# Patient Record
Sex: FEMALE | Race: OTHER | URBAN - METROPOLITAN AREA
[De-identification: names, ages, dates, MRNs, and addresses within clinical notes are randomized per-mention and may not be internally consistent; named-entity substitution may affect disease eponyms.]

---

## 2018-01-28 ENCOUNTER — EMERGENCY (EMERGENCY)
Facility: HOSPITAL | Age: 63
LOS: 0 days | Discharge: HOME | End: 2018-01-28
Admitting: EMERGENCY MEDICINE

## 2018-01-28 DIAGNOSIS — R11.0 NAUSEA: ICD-10-CM

## 2018-01-28 DIAGNOSIS — R05 COUGH: ICD-10-CM

## 2018-01-28 DIAGNOSIS — I10 ESSENTIAL (PRIMARY) HYPERTENSION: ICD-10-CM

## 2018-01-28 DIAGNOSIS — Z91.018 ALLERGY TO OTHER FOODS: ICD-10-CM

## 2018-01-28 DIAGNOSIS — J45.901 UNSPECIFIED ASTHMA WITH (ACUTE) EXACERBATION: ICD-10-CM

## 2018-01-28 DIAGNOSIS — R19.7 DIARRHEA, UNSPECIFIED: ICD-10-CM

## 2018-03-18 ENCOUNTER — EMERGENCY (EMERGENCY)
Facility: HOSPITAL | Age: 63
LOS: 0 days | Discharge: HOME | End: 2018-03-18

## 2018-03-18 VITALS
TEMPERATURE: 98 F | OXYGEN SATURATION: 98 % | RESPIRATION RATE: 18 BRPM | HEART RATE: 76 BPM | DIASTOLIC BLOOD PRESSURE: 85 MMHG | SYSTOLIC BLOOD PRESSURE: 155 MMHG

## 2018-03-18 DIAGNOSIS — J45.909 UNSPECIFIED ASTHMA, UNCOMPLICATED: ICD-10-CM

## 2018-03-18 DIAGNOSIS — Z96.659 PRESENCE OF UNSPECIFIED ARTIFICIAL KNEE JOINT: ICD-10-CM
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
## 2018-03-18 DIAGNOSIS — Z79.899 OTHER LONG TERM (CURRENT) DRUG THERAPY: ICD-10-CM

## 2018-03-18 DIAGNOSIS — I10 ESSENTIAL (PRIMARY) HYPERTENSION: ICD-10-CM

## 2018-03-18 DIAGNOSIS — M25.562 PAIN IN LEFT KNEE: ICD-10-CM

## 2018-03-18 DIAGNOSIS — Z96.659 PRESENCE OF UNSPECIFIED ARTIFICIAL KNEE JOINT: Chronic | ICD-10-CM
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
## 2018-03-18 DIAGNOSIS — M25.572 PAIN IN LEFT ANKLE AND JOINTS OF LEFT FOOT: ICD-10-CM
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
## 2018-03-18 RX ORDER — IBUPROFEN 200 MG
600 TABLET ORAL ONCE
Qty: 0 | Refills: 0 | Status: COMPLETED | OUTPATIENT
Start: 2018-03-18 | End: 2018-03-18

## 2018-03-18 RX ORDER — ALBUTEROL 90 UG/1
0 AEROSOL, METERED ORAL
Qty: 0 | Refills: 0 | COMMUNITY

## 2018-03-18 RX ORDER — MONTELUKAST 4 MG/1
0 TABLET, CHEWABLE ORAL
Qty: 0 | Refills: 0 | COMMUNITY

## 2018-03-18 RX ADMIN — Medication 600 MILLIGRAM(S): at 10:27

## 2018-03-18 RX ADMIN — Medication 600 MILLIGRAM(S): at 11:12

## 2018-03-18 NOTE — ED PROVIDER NOTE - OBJECTIVE STATEMENT
61 yo female with h/o asthma and HTN presents to the ED s/p mechanical fall last night 1030 pm c/o left ankle and left knee pain. Patient tripped outside last night. No head trauma or LOC. No addition trauma. no blood thinners. Denies fever, chills, chest pain, sob, abdominal, N/V, headache, dizziness, LE weakness or paresthesias. Patient having difficulty ambulating since fall. 61 yo female with h/o asthma and HTN presents to the ED s/p mechanical fall last night 1030 pm c/o left ankle and left knee pain. Patient tripped outside last night. No head trauma or LOC. No addition trauma. no blood thinners. Denies fever, chills, chest pain, sob, abdominal, N/V, headache, dizziness, LE weakness or paresthesias. Patient having difficulty ambulating since fall. + h/o left knee replacement.

## 2018-03-18 NOTE — ED PROVIDER NOTE - PHYSICAL EXAMINATION
GENERAL:  well appearing, non-toxic patient in no acute distress  SKIN: skin warm, pink and dry. MMM. no swelling, erythema, abrasions, laceration to left LE.   PULM: CTAB. Normal respiratory effort. No respiratory distress. No wheezes, stridor, rales or rhonchi. No retractions  CV: RRR, no M/R/G.  MSK: + TTP to left lateral mallelous and anterior knee joint with decreased ROM due to pain. no metatarsal tenderness. unable to weight bear. No edema, erythema, cyanosis. Distal pulses intact.  NEURO: A+Ox3, no sensory/motor deficits

## 2018-03-18 NOTE — ED ADULT NURSE NOTE - CHIEF COMPLAINT QUOTE
Fall last night while walking and twisted left knee pain. patient complaint of throbbing, swelling and increased pain

## 2018-03-18 NOTE — ED PROVIDER NOTE - NS ED ROS FT
Constitutional: no fever, chills  Cardiovascular: no chest pain, no sob, no syncope , no palpitations, no peripheral edema  Respiratory: no cough, no shortness of breath, no h/o asthma or COPD.  Gastrointestinal: no nausea, vomiting  Musculoskeletal: + left knee and left ankle pain. no neck or back pain   Integumentary: no rash or skin changes. no edema  Neurological: no UE/LE weakness or paresthesias.

## 2021-06-16 PROBLEM — Z00.00 ENCOUNTER FOR PREVENTIVE HEALTH EXAMINATION: Status: ACTIVE | Noted: 2021-06-16

## 2021-12-13 ENCOUNTER — TRANSCRIPTION ENCOUNTER (OUTPATIENT)
Age: 66
End: 2021-12-13

## 2022-07-06 PROBLEM — I10 ESSENTIAL (PRIMARY) HYPERTENSION: Chronic | Status: ACTIVE | Noted: 2018-03-18

## 2022-07-06 PROBLEM — J45.909 UNSPECIFIED ASTHMA, UNCOMPLICATED: Chronic | Status: ACTIVE | Noted: 2018-03-18

## 2022-07-10 ENCOUNTER — NON-APPOINTMENT (OUTPATIENT)
Age: 67
End: 2022-07-10

## 2022-07-13 ENCOUNTER — APPOINTMENT (OUTPATIENT)
Dept: PULMONOLOGY | Facility: CLINIC | Age: 67
End: 2022-07-13

## 2022-07-25 ENCOUNTER — APPOINTMENT (OUTPATIENT)
Dept: PULMONOLOGY | Facility: CLINIC | Age: 67
End: 2022-07-25

## 2022-09-26 ENCOUNTER — APPOINTMENT (OUTPATIENT)
Dept: PULMONOLOGY | Facility: CLINIC | Age: 67
End: 2022-09-26

## 2023-02-28 ENCOUNTER — INPATIENT (INPATIENT)
Facility: HOSPITAL | Age: 68
LOS: 10 days | Discharge: HOME HEALTH | DRG: 286 | End: 2023-03-11
Attending: STUDENT IN AN ORGANIZED HEALTH CARE EDUCATION/TRAINING PROGRAM | Admitting: INTERNAL MEDICINE
Payer: OTHER GOVERNMENT

## 2023-02-28 VITALS
HEART RATE: 110 BPM | RESPIRATION RATE: 21 BRPM | DIASTOLIC BLOOD PRESSURE: 83 MMHG | HEIGHT: 68 IN | WEIGHT: 220.9 LBS | TEMPERATURE: 99 F | SYSTOLIC BLOOD PRESSURE: 141 MMHG

## 2023-02-28 DIAGNOSIS — J96.22 ACUTE AND CHRONIC RESPIRATORY FAILURE WITH HYPERCAPNIA: ICD-10-CM

## 2023-02-28 DIAGNOSIS — Z96.659 PRESENCE OF UNSPECIFIED ARTIFICIAL KNEE JOINT: Chronic | ICD-10-CM

## 2023-02-28 LAB
ALBUMIN SERPL ELPH-MCNC: 4 G/DL — SIGNIFICANT CHANGE UP (ref 3.5–5.2)
ALP SERPL-CCNC: 98 U/L — SIGNIFICANT CHANGE UP (ref 30–115)
ALT FLD-CCNC: 28 U/L — SIGNIFICANT CHANGE UP (ref 0–41)
ANION GAP SERPL CALC-SCNC: 7 MMOL/L — SIGNIFICANT CHANGE UP (ref 7–14)
AST SERPL-CCNC: 29 U/L — SIGNIFICANT CHANGE UP (ref 0–41)
BASE EXCESS BLDV CALC-SCNC: 4.1 MMOL/L — HIGH (ref -2–3)
BASOPHILS # BLD AUTO: 0.02 K/UL — SIGNIFICANT CHANGE UP (ref 0–0.2)
BASOPHILS NFR BLD AUTO: 0.4 % — SIGNIFICANT CHANGE UP (ref 0–1)
BILIRUB SERPL-MCNC: 0.3 MG/DL — SIGNIFICANT CHANGE UP (ref 0.2–1.2)
BUN SERPL-MCNC: 19 MG/DL — SIGNIFICANT CHANGE UP (ref 10–20)
CA-I SERPL-SCNC: 1.25 MMOL/L — SIGNIFICANT CHANGE UP (ref 1.15–1.33)
CALCIUM SERPL-MCNC: 8.8 MG/DL — SIGNIFICANT CHANGE UP (ref 8.4–10.5)
CHLORIDE SERPL-SCNC: 109 MMOL/L — SIGNIFICANT CHANGE UP (ref 98–110)
CO2 SERPL-SCNC: 27 MMOL/L — SIGNIFICANT CHANGE UP (ref 17–32)
CREAT SERPL-MCNC: 0.9 MG/DL — SIGNIFICANT CHANGE UP (ref 0.7–1.5)
EGFR: 71 ML/MIN/1.73M2 — SIGNIFICANT CHANGE UP
EOSINOPHIL # BLD AUTO: 0.07 K/UL — SIGNIFICANT CHANGE UP (ref 0–0.7)
EOSINOPHIL NFR BLD AUTO: 1.6 % — SIGNIFICANT CHANGE UP (ref 0–8)
FLUAV AG NPH QL: SIGNIFICANT CHANGE UP
FLUBV AG NPH QL: SIGNIFICANT CHANGE UP
GAS PNL BLDV: 139 MMOL/L — SIGNIFICANT CHANGE UP (ref 136–145)
GAS PNL BLDV: SIGNIFICANT CHANGE UP
GAS PNL BLDV: SIGNIFICANT CHANGE UP
GLUCOSE SERPL-MCNC: 96 MG/DL — SIGNIFICANT CHANGE UP (ref 70–99)
HCO3 BLDV-SCNC: 32 MMOL/L — HIGH (ref 22–29)
HCT VFR BLD CALC: 37 % — SIGNIFICANT CHANGE UP (ref 37–47)
HCT VFR BLDA CALC: 32 % — LOW (ref 39–51)
HGB BLD CALC-MCNC: 10.7 G/DL — LOW (ref 12.6–17.4)
HGB BLD-MCNC: 11.3 G/DL — LOW (ref 12–16)
IMM GRANULOCYTES NFR BLD AUTO: 0.2 % — SIGNIFICANT CHANGE UP (ref 0.1–0.3)
LACTATE BLDV-MCNC: 0.8 MMOL/L — SIGNIFICANT CHANGE UP (ref 0.5–2)
LYMPHOCYTES # BLD AUTO: 0.74 K/UL — LOW (ref 1.2–3.4)
LYMPHOCYTES # BLD AUTO: 16.4 % — LOW (ref 20.5–51.1)
MCHC RBC-ENTMCNC: 27.8 PG — SIGNIFICANT CHANGE UP (ref 27–31)
MCHC RBC-ENTMCNC: 30.5 G/DL — LOW (ref 32–37)
MCV RBC AUTO: 91.1 FL — SIGNIFICANT CHANGE UP (ref 81–99)
MONOCYTES # BLD AUTO: 0.29 K/UL — SIGNIFICANT CHANGE UP (ref 0.1–0.6)
MONOCYTES NFR BLD AUTO: 6.4 % — SIGNIFICANT CHANGE UP (ref 1.7–9.3)
NEUTROPHILS # BLD AUTO: 3.38 K/UL — SIGNIFICANT CHANGE UP (ref 1.4–6.5)
NEUTROPHILS NFR BLD AUTO: 75 % — SIGNIFICANT CHANGE UP (ref 42.2–75.2)
NRBC # BLD: 0 /100 WBCS — SIGNIFICANT CHANGE UP (ref 0–0)
PCO2 BLDV: 63 MMHG — HIGH (ref 39–42)
PH BLDV: 7.31 — LOW (ref 7.32–7.43)
PLATELET # BLD AUTO: 235 K/UL — SIGNIFICANT CHANGE UP (ref 130–400)
PO2 BLDV: 16 MMHG — SIGNIFICANT CHANGE UP
POTASSIUM BLDV-SCNC: 4.2 MMOL/L — SIGNIFICANT CHANGE UP (ref 3.5–5.1)
POTASSIUM SERPL-MCNC: 4.4 MMOL/L — SIGNIFICANT CHANGE UP (ref 3.5–5)
POTASSIUM SERPL-SCNC: 4.4 MMOL/L — SIGNIFICANT CHANGE UP (ref 3.5–5)
PROT SERPL-MCNC: 6.4 G/DL — SIGNIFICANT CHANGE UP (ref 6–8)
RBC # BLD: 4.06 M/UL — LOW (ref 4.2–5.4)
RBC # FLD: 13.5 % — SIGNIFICANT CHANGE UP (ref 11.5–14.5)
RSV RNA NPH QL NAA+NON-PROBE: SIGNIFICANT CHANGE UP
SAO2 % BLDV: 17 % — SIGNIFICANT CHANGE UP
SARS-COV-2 RNA SPEC QL NAA+PROBE: SIGNIFICANT CHANGE UP
SODIUM SERPL-SCNC: 143 MMOL/L — SIGNIFICANT CHANGE UP (ref 135–146)
TROPONIN T SERPL-MCNC: <0.01 NG/ML — SIGNIFICANT CHANGE UP
WBC # BLD: 4.51 K/UL — LOW (ref 4.8–10.8)
WBC # FLD AUTO: 4.51 K/UL — LOW (ref 4.8–10.8)

## 2023-02-28 PROCEDURE — 80048 BASIC METABOLIC PNL TOTAL CA: CPT

## 2023-02-28 PROCEDURE — 87081 CULTURE SCREEN ONLY: CPT

## 2023-02-28 PROCEDURE — 84145 PROCALCITONIN (PCT): CPT

## 2023-02-28 PROCEDURE — A9560: CPT

## 2023-02-28 PROCEDURE — C1769: CPT

## 2023-02-28 PROCEDURE — 83540 ASSAY OF IRON: CPT

## 2023-02-28 PROCEDURE — 83880 ASSAY OF NATRIURETIC PEPTIDE: CPT

## 2023-02-28 PROCEDURE — 87449 NOS EACH ORGANISM AG IA: CPT

## 2023-02-28 PROCEDURE — 82728 ASSAY OF FERRITIN: CPT

## 2023-02-28 PROCEDURE — 86334 IMMUNOFIX E-PHORESIS SERUM: CPT

## 2023-02-28 PROCEDURE — 94640 AIRWAY INHALATION TREATMENT: CPT

## 2023-02-28 PROCEDURE — 80053 COMPREHEN METABOLIC PANEL: CPT

## 2023-02-28 PROCEDURE — 85027 COMPLETE CBC AUTOMATED: CPT

## 2023-02-28 PROCEDURE — 83550 IRON BINDING TEST: CPT

## 2023-02-28 PROCEDURE — U0005: CPT

## 2023-02-28 PROCEDURE — C1894: CPT

## 2023-02-28 PROCEDURE — 93458 L HRT ARTERY/VENTRICLE ANGIO: CPT

## 2023-02-28 PROCEDURE — 78803 RP LOCLZJ TUM SPECT 1 AREA: CPT

## 2023-02-28 PROCEDURE — 85379 FIBRIN DEGRADATION QUANT: CPT

## 2023-02-28 PROCEDURE — 87040 BLOOD CULTURE FOR BACTERIA: CPT

## 2023-02-28 PROCEDURE — 0225U NFCT DS DNA&RNA 21 SARSCOV2: CPT

## 2023-02-28 PROCEDURE — 93306 TTE W/DOPPLER COMPLETE: CPT

## 2023-02-28 PROCEDURE — 84156 ASSAY OF PROTEIN URINE: CPT

## 2023-02-28 PROCEDURE — 87899 AGENT NOS ASSAY W/OPTIC: CPT

## 2023-02-28 PROCEDURE — 71045 X-RAY EXAM CHEST 1 VIEW: CPT

## 2023-02-28 PROCEDURE — C1887: CPT

## 2023-02-28 PROCEDURE — 36415 COLL VENOUS BLD VENIPUNCTURE: CPT

## 2023-02-28 PROCEDURE — 83735 ASSAY OF MAGNESIUM: CPT

## 2023-02-28 PROCEDURE — 99285 EMERGENCY DEPT VISIT HI MDM: CPT | Mod: GC

## 2023-02-28 PROCEDURE — 83605 ASSAY OF LACTIC ACID: CPT

## 2023-02-28 PROCEDURE — 81001 URINALYSIS AUTO W/SCOPE: CPT

## 2023-02-28 PROCEDURE — 93010 ELECTROCARDIOGRAM REPORT: CPT | Mod: 77

## 2023-02-28 PROCEDURE — 87086 URINE CULTURE/COLONY COUNT: CPT

## 2023-02-28 PROCEDURE — 71045 X-RAY EXAM CHEST 1 VIEW: CPT | Mod: 26

## 2023-02-28 PROCEDURE — U0003: CPT

## 2023-02-28 PROCEDURE — 83521 IG LIGHT CHAINS FREE EACH: CPT

## 2023-02-28 PROCEDURE — 86803 HEPATITIS C AB TEST: CPT

## 2023-02-28 PROCEDURE — 85025 COMPLETE CBC W/AUTO DIFF WBC: CPT

## 2023-02-28 PROCEDURE — 86335 IMMUNFIX E-PHORSIS/URINE/CSF: CPT

## 2023-02-28 RX ORDER — AZITHROMYCIN 500 MG/1
500 TABLET, FILM COATED ORAL ONCE
Refills: 0 | Status: COMPLETED | OUTPATIENT
Start: 2023-02-28 | End: 2023-02-28

## 2023-02-28 RX ORDER — MAGNESIUM SULFATE 500 MG/ML
2 VIAL (ML) INJECTION ONCE
Refills: 0 | Status: COMPLETED | OUTPATIENT
Start: 2023-02-28 | End: 2023-02-28

## 2023-02-28 RX ORDER — ONDANSETRON 8 MG/1
4 TABLET, FILM COATED ORAL ONCE
Refills: 0 | Status: COMPLETED | OUTPATIENT
Start: 2023-02-28 | End: 2023-02-28

## 2023-02-28 RX ORDER — ACETAMINOPHEN 500 MG
975 TABLET ORAL ONCE
Refills: 0 | Status: COMPLETED | OUTPATIENT
Start: 2023-02-28 | End: 2023-02-28

## 2023-02-28 RX ORDER — IPRATROPIUM/ALBUTEROL SULFATE 18-103MCG
3 AEROSOL WITH ADAPTER (GRAM) INHALATION
Refills: 0 | Status: COMPLETED | OUTPATIENT
Start: 2023-02-28 | End: 2023-02-28

## 2023-02-28 RX ORDER — CEFTRIAXONE 500 MG/1
1000 INJECTION, POWDER, FOR SOLUTION INTRAMUSCULAR; INTRAVENOUS ONCE
Refills: 0 | Status: COMPLETED | OUTPATIENT
Start: 2023-02-28 | End: 2023-02-28

## 2023-02-28 RX ADMIN — Medication 125 MILLIGRAM(S): at 18:08

## 2023-02-28 RX ADMIN — Medication 3 MILLILITER(S): at 18:17

## 2023-02-28 RX ADMIN — AZITHROMYCIN 255 MILLIGRAM(S): 500 TABLET, FILM COATED ORAL at 19:51

## 2023-02-28 RX ADMIN — Medication 150 GRAM(S): at 18:17

## 2023-02-28 RX ADMIN — Medication 975 MILLIGRAM(S): at 22:14

## 2023-02-28 RX ADMIN — Medication 3 MILLILITER(S): at 18:08

## 2023-02-28 RX ADMIN — CEFTRIAXONE 100 MILLIGRAM(S): 500 INJECTION, POWDER, FOR SOLUTION INTRAMUSCULAR; INTRAVENOUS at 18:00

## 2023-02-28 NOTE — ED PROVIDER NOTE - OBJECTIVE STATEMENT
67-year-old female past medical history of asthma with multiple hospitalizations last was over 5 years ago, no intubation presents for asthma exacerbation.  History goes back to 3 weeks ago where patient had a URI and had to use her Symbicort and rescue inhaler more often.  Patient use her Symbicort inhaler twice daily at baseline.  Over the last 3 days her shortness of breath acutely worsened where she is using her albuterol inhaler every 15 to 20 minutes.  Patient was picked up by EMS and given DuoNebs that made her feel better.  No nausea, vomiting, fever, chest pain, back pain, abdominal pain.  No sputum production.

## 2023-02-28 NOTE — ED ADULT TRIAGE NOTE - MODE OF ARRIVAL
EMS Ambulance negative normal/ROM intact/normal gait/strength 5/5 bilateral upper extremities/strength 5/5 bilateral lower extremities

## 2023-02-28 NOTE — ED PROVIDER NOTE - CLINICAL SUMMARY MEDICAL DECISION MAKING FREE TEXT BOX
Labs okay, chest x-ray with possible right middle lobe/right lower lobe infiltrate.  Patient improved with nebs and steroids, work of breathing is normal now, still with scattered wheezing.  Given antibiotics, fluids, will admit.

## 2023-02-28 NOTE — ED PROVIDER NOTE - PROGRESS NOTE DETAILS
TN - s/p 3duonebs and steroids; pt moving air w/ wheezes L>R; Xr noted, compared to previous, it is very similar but has increased infiltrated/fluffy. discussed admission w/ pt; she amenable.

## 2023-02-28 NOTE — ED PROVIDER NOTE - PHYSICAL EXAMINATION
CONSTITUTIONAL: nontoxic appearing, in no acute distress  HEAD:  normocephalic, atraumatic  EYES:  no conjunctival injection, no eye discharge, tracking well  ENT:  tympanic membranes intact bilaterally, moist mucous membranes, no oropharyngeal ulcerations or lesions, no tonsillar swelling or erythema, no tonsillar exudates  NECK:  supple, no masses, no tender anterior/posterior cervical lymphadenopathy  CV:  regular rate and rhythm, cap refill < 2 seconds  RESP:  poor airmovement  ABD:  soft, nontender, nondistended, no masses, no organomegaly  LYMPH:  no significant lymphadenopathy  MSK/NEURO:  normal movement, normal tone  SKIN:  warm, dry, no rash

## 2023-02-28 NOTE — ED PROVIDER NOTE - CARE PLAN
1 Principal Discharge DX:	Acute on chronic respiratory failure with hypercapnia  Secondary Diagnosis:	COPD exacerbation  Secondary Diagnosis:	Opacity of lung on imaging study

## 2023-02-28 NOTE — ED PROVIDER NOTE - CONSIDERATION OF ADMISSION OBSERVATION
pt still with SOB despite nebs, though she is improved; CXR with poss RML/RLL infiltrate Consideration of Admission/Observation

## 2023-02-28 NOTE — ED PROVIDER NOTE - ATTENDING CONTRIBUTION TO CARE
67-year-old woman, history of asthma with hospitalization in the past but no history of intubation presents for asthma exacerbation.  Patient reports URI symptoms over the past 3 weeks, has been using her Symbicort and rescue inhaler very frequently.  Over the last 3 days, shortness of breath is worsened, and there is been no improvement with her MDI.  She was so uncomfortable today that she called 911, she was given DuoNebs in the ambulance, but still tachypneic, tachycardic, with poor air movement on initial evaluation in the ED.  Speaking in short sentences, increased work of breathing.  Will give nebs, steroids, check labs and chest x-ray, reassess.

## 2023-03-01 LAB
ANION GAP SERPL CALC-SCNC: 9 MMOL/L — SIGNIFICANT CHANGE UP (ref 7–14)
BASOPHILS # BLD AUTO: 0 K/UL — SIGNIFICANT CHANGE UP (ref 0–0.2)
BASOPHILS NFR BLD AUTO: 0 % — SIGNIFICANT CHANGE UP (ref 0–1)
BUN SERPL-MCNC: 17 MG/DL — SIGNIFICANT CHANGE UP (ref 10–20)
CALCIUM SERPL-MCNC: 8.9 MG/DL — SIGNIFICANT CHANGE UP (ref 8.4–10.5)
CHLORIDE SERPL-SCNC: 106 MMOL/L — SIGNIFICANT CHANGE UP (ref 98–110)
CO2 SERPL-SCNC: 26 MMOL/L — SIGNIFICANT CHANGE UP (ref 17–32)
CREAT SERPL-MCNC: 0.8 MG/DL — SIGNIFICANT CHANGE UP (ref 0.7–1.5)
D DIMER BLD IA.RAPID-MCNC: 180 NG/ML DDU — SIGNIFICANT CHANGE UP
EGFR: 82 ML/MIN/1.73M2 — SIGNIFICANT CHANGE UP
EOSINOPHIL # BLD AUTO: 0 K/UL — SIGNIFICANT CHANGE UP (ref 0–0.7)
EOSINOPHIL NFR BLD AUTO: 0 % — SIGNIFICANT CHANGE UP (ref 0–8)
GLUCOSE SERPL-MCNC: 146 MG/DL — HIGH (ref 70–99)
HCT VFR BLD CALC: 35.5 % — LOW (ref 37–47)
HCV AB S/CO SERPL IA: 0.04 COI — SIGNIFICANT CHANGE UP
HCV AB SERPL-IMP: SIGNIFICANT CHANGE UP
HGB BLD-MCNC: 10.8 G/DL — LOW (ref 12–16)
IMM GRANULOCYTES NFR BLD AUTO: 0.4 % — HIGH (ref 0.1–0.3)
LYMPHOCYTES # BLD AUTO: 0.39 K/UL — LOW (ref 1.2–3.4)
LYMPHOCYTES # BLD AUTO: 7.5 % — LOW (ref 20.5–51.1)
MAGNESIUM SERPL-MCNC: 2.2 MG/DL — SIGNIFICANT CHANGE UP (ref 1.8–2.4)
MCHC RBC-ENTMCNC: 28 PG — SIGNIFICANT CHANGE UP (ref 27–31)
MCHC RBC-ENTMCNC: 30.4 G/DL — LOW (ref 32–37)
MCV RBC AUTO: 92 FL — SIGNIFICANT CHANGE UP (ref 81–99)
MONOCYTES # BLD AUTO: 0.15 K/UL — SIGNIFICANT CHANGE UP (ref 0.1–0.6)
MONOCYTES NFR BLD AUTO: 2.9 % — SIGNIFICANT CHANGE UP (ref 1.7–9.3)
NEUTROPHILS # BLD AUTO: 4.62 K/UL — SIGNIFICANT CHANGE UP (ref 1.4–6.5)
NEUTROPHILS NFR BLD AUTO: 89.2 % — HIGH (ref 42.2–75.2)
NRBC # BLD: 0 /100 WBCS — SIGNIFICANT CHANGE UP (ref 0–0)
PLATELET # BLD AUTO: 234 K/UL — SIGNIFICANT CHANGE UP (ref 130–400)
POTASSIUM SERPL-MCNC: 4.5 MMOL/L — SIGNIFICANT CHANGE UP (ref 3.5–5)
POTASSIUM SERPL-SCNC: 4.5 MMOL/L — SIGNIFICANT CHANGE UP (ref 3.5–5)
RBC # BLD: 3.86 M/UL — LOW (ref 4.2–5.4)
RBC # FLD: 13.5 % — SIGNIFICANT CHANGE UP (ref 11.5–14.5)
SODIUM SERPL-SCNC: 141 MMOL/L — SIGNIFICANT CHANGE UP (ref 135–146)
WBC # BLD: 5.18 K/UL — SIGNIFICANT CHANGE UP (ref 4.8–10.8)
WBC # FLD AUTO: 5.18 K/UL — SIGNIFICANT CHANGE UP (ref 4.8–10.8)

## 2023-03-01 PROCEDURE — 99223 1ST HOSP IP/OBS HIGH 75: CPT

## 2023-03-01 RX ORDER — FERROUS SULFATE 325(65) MG
325 TABLET ORAL DAILY
Refills: 0 | Status: DISCONTINUED | OUTPATIENT
Start: 2023-03-01 | End: 2023-03-11

## 2023-03-01 RX ORDER — FLUTICASONE PROPIONATE AND SALMETEROL 50; 250 UG/1; UG/1
1 POWDER ORAL; RESPIRATORY (INHALATION)
Qty: 0 | Refills: 0 | DISCHARGE

## 2023-03-01 RX ORDER — IBUPROFEN 200 MG
600 TABLET ORAL ONCE
Refills: 0 | Status: COMPLETED | OUTPATIENT
Start: 2023-03-01 | End: 2023-03-01

## 2023-03-01 RX ORDER — AZITHROMYCIN 500 MG/1
500 TABLET, FILM COATED ORAL DAILY
Refills: 0 | Status: DISCONTINUED | OUTPATIENT
Start: 2023-03-01 | End: 2023-03-03

## 2023-03-01 RX ORDER — CEFTRIAXONE 500 MG/1
1000 INJECTION, POWDER, FOR SOLUTION INTRAMUSCULAR; INTRAVENOUS EVERY 24 HOURS
Refills: 0 | Status: DISCONTINUED | OUTPATIENT
Start: 2023-03-01 | End: 2023-03-02

## 2023-03-01 RX ORDER — ALBUTEROL 90 UG/1
2 AEROSOL, METERED ORAL
Refills: 0 | Status: DISCONTINUED | OUTPATIENT
Start: 2023-03-01 | End: 2023-03-11

## 2023-03-01 RX ORDER — BUDESONIDE AND FORMOTEROL FUMARATE DIHYDRATE 160; 4.5 UG/1; UG/1
2 AEROSOL RESPIRATORY (INHALATION)
Refills: 0 | Status: DISCONTINUED | OUTPATIENT
Start: 2023-03-01 | End: 2023-03-11

## 2023-03-01 RX ORDER — INFLUENZA VIRUS VACCINE 15; 15; 15; 15 UG/.5ML; UG/.5ML; UG/.5ML; UG/.5ML
0.7 SUSPENSION INTRAMUSCULAR ONCE
Refills: 0 | Status: DISCONTINUED | OUTPATIENT
Start: 2023-03-01 | End: 2023-03-11

## 2023-03-01 RX ORDER — IPRATROPIUM/ALBUTEROL SULFATE 18-103MCG
3 AEROSOL WITH ADAPTER (GRAM) INHALATION EVERY 6 HOURS
Refills: 0 | Status: DISCONTINUED | OUTPATIENT
Start: 2023-03-01 | End: 2023-03-11

## 2023-03-01 RX ORDER — ALBUTEROL 90 UG/1
2 AEROSOL, METERED ORAL EVERY 6 HOURS
Refills: 0 | Status: DISCONTINUED | OUTPATIENT
Start: 2023-03-01 | End: 2023-03-01

## 2023-03-01 RX ORDER — ENOXAPARIN SODIUM 100 MG/ML
40 INJECTION SUBCUTANEOUS EVERY 24 HOURS
Refills: 0 | Status: DISCONTINUED | OUTPATIENT
Start: 2023-03-01 | End: 2023-03-06

## 2023-03-01 RX ORDER — MONTELUKAST 4 MG/1
10 TABLET, CHEWABLE ORAL DAILY
Refills: 0 | Status: DISCONTINUED | OUTPATIENT
Start: 2023-03-01 | End: 2023-03-11

## 2023-03-01 RX ADMIN — Medication 3 MILLILITER(S): at 13:51

## 2023-03-01 RX ADMIN — ENOXAPARIN SODIUM 40 MILLIGRAM(S): 100 INJECTION SUBCUTANEOUS at 05:05

## 2023-03-01 RX ADMIN — Medication 325 MILLIGRAM(S): at 12:19

## 2023-03-01 RX ADMIN — AZITHROMYCIN 500 MILLIGRAM(S): 500 TABLET, FILM COATED ORAL at 12:18

## 2023-03-01 RX ADMIN — Medication 600 MILLIGRAM(S): at 20:54

## 2023-03-01 RX ADMIN — Medication 40 MILLIGRAM(S): at 05:06

## 2023-03-01 RX ADMIN — MONTELUKAST 10 MILLIGRAM(S): 4 TABLET, CHEWABLE ORAL at 12:19

## 2023-03-01 RX ADMIN — BUDESONIDE AND FORMOTEROL FUMARATE DIHYDRATE 2 PUFF(S): 160; 4.5 AEROSOL RESPIRATORY (INHALATION) at 08:45

## 2023-03-01 RX ADMIN — Medication 3 MILLILITER(S): at 20:53

## 2023-03-01 RX ADMIN — BUDESONIDE AND FORMOTEROL FUMARATE DIHYDRATE 2 PUFF(S): 160; 4.5 AEROSOL RESPIRATORY (INHALATION) at 20:00

## 2023-03-01 RX ADMIN — Medication 600 MILLIGRAM(S): at 20:24

## 2023-03-01 RX ADMIN — CEFTRIAXONE 100 MILLIGRAM(S): 500 INJECTION, POWDER, FOR SOLUTION INTRAMUSCULAR; INTRAVENOUS at 17:31

## 2023-03-01 NOTE — H&P ADULT - ASSESSMENT
67-year-old female past medical history of asthma with multiple hospitalizations without intubation, last was 15 years ago, presents with SOB. History goes back to 3 weeks ago where patient had SOB with cough and had to use her Symbicort and albuterol rescue inhaler more often. Patient use her Symbicort inhaler twice daily at baseline. Over the last 3 days her shortness of breath acutely worsened where she is using her albuterol inhaler every 15 to 20 minutes with no relief. Her oxygen saturation is 95% at baseline, but it decreased to 92%. Patient was picked up by EMS and given DuoNebs that made her feel better.  No nausea, vomiting, fever, chest pain, back pain, abdominal pain. No sputum production.    In ED, treated with azithromycin, ceftriaxone, duoneb, solumedrol 125 mg, zofran.  Admitted for asthma exacerbation and possible pneumonia    #Asthma exacerbation, possible pneumonia  - CXR showing RLL opacity   67-year-old female past medical history of asthma with multiple hospitalizations without intubation, last was 15 years ago, iron deficiency anemia presents with SOB. History goes back to 3 weeks ago where patient had SOB with cough and had to use her Symbicort and albuterol rescue inhaler more often. Patient use her Symbicort inhaler twice daily at baseline. Over the last 3 days her shortness of breath acutely worsened where she is using her albuterol inhaler every 15 to 20 minutes with no relief. Her oxygen saturation is 95% at baseline, but it decreased to 92%. Patient was picked up by EMS and given DuoNebs that made her feel better.  No nausea, vomiting, fever, chest pain, back pain, abdominal pain. No sputum production.    In ED, treated with azithromycin, ceftriaxone, duoneb, solumedrol 125 mg, zofran.  Admitted for asthma exacerbation and possible pneumonia    #SOB likely due to Asthma exacerbation, possible pneumonia  - CXR showing RLL opacity, f/u official read  - VBG pCO2 elevated  - no wheezing on my exam  - Albuterol, symbicort, duonebs  - prednisone 40 mg 5 days  - Ceftriaxone 1 g daily  - BIPAP at night for CO2 retention    #Iron deficiency anemia  - ferrous sulfate daily    #Diet- regular  #DVT prophylaxis- lovenox 40 mg daily  Pending- wean off oxygen, resolution of chest tightness and SOB 67-year-old female past medical history of asthma with multiple hospitalizations without intubation, last was 15 years ago, iron deficiency anemia presents with SOB. History goes back to 3 weeks ago where patient had SOB with cough and had to use her Symbicort and albuterol rescue inhaler more often. Patient use her Symbicort inhaler twice daily at baseline. Over the last 3 days her shortness of breath acutely worsened where she is using her albuterol inhaler every 15 to 20 minutes with no relief. Her oxygen saturation is 95% at baseline, but it decreased to 92%. Patient was picked up by EMS and given DuoNebs that made her feel better.  No nausea, vomiting, fever, chest pain, back pain, abdominal pain. No sputum production.    In ED, treated with azithromycin, ceftriaxone, duoneb, solumedrol 125 mg, zofran.  Admitted for asthma exacerbation and possible pneumonia    #SOB likely due to Asthma exacerbation, possible pneumonia  - CXR showing RLL opacity, f/u official read  - VBG pCO2 elevated  - no wheezing on my exam  - Albuterol, symbicort, duonebs  - prednisone 40 mg 5 days  - Ceftriaxone 1 g daily  - BIPAP at night for CO2 retention  - consider repeat VBG to measure pCO2    #Iron deficiency anemia  - ferrous sulfate daily    #Diet- regular  #DVT prophylaxis- lovenox 40 mg daily  Pending- wean off oxygen, resolution of chest tightness and SOB

## 2023-03-01 NOTE — H&P ADULT - HISTORY OF PRESENT ILLNESS
67-year-old female past medical history of asthma with multiple hospitalizations without intubation, last was 15 years ago, presents with SOB. History goes back to 3 weeks ago where patient had SOB with cough and had to use her Symbicort and albuterol rescue inhaler more often. Patient use her Symbicort inhaler twice daily at baseline. Over the last 3 days her shortness of breath acutely worsened where she is using her albuterol inhaler every 15 to 20 minutes with no relief. Her oxygen saturation is 95% at baseline, but it decreased to 92%. Patient was picked up by EMS and given DuoNebs that made her feel better.  No nausea, vomiting, fever, chest pain, back pain, abdominal pain.  No sputum production.    In ED VS insignifcant except for , oxygen saturation 95% RA  Labs significant for VBG pH 7.31, pCO2 63, RVP negative  Imaging wet read of CXR showing RLL opacity. However, this may have been present on previous CXR  In ED, treated with azithromycin, ceftriaxone, duoneb, solumedrol 125 mg, zofran.  Admitted for asthma exacerbation and possible pneumonia   67-year-old female past medical history of asthma with multiple hospitalizations without intubation, last was 15 years ago, presents with SOB. History goes back to 3 weeks ago where patient had SOB with cough, chest tightness and had to use her Symbicort and albuterol rescue inhaler more often. Patient use her Symbicort inhaler twice daily at baseline. Over the last 3 days her shortness of breath acutely worsened where she is using her albuterol inhaler every 15 to 20 minutes with no relief. Her oxygen saturation is 95% at baseline, but it decreased to 92%. Patient was picked up by EMS and given DuoNebs that made her feel better.  No nausea, vomiting, fever, chest pain, back pain, abdominal pain.  No sputum production.    In ED VS insignifcant except for , oxygen saturation 95% RA  Labs significant for VBG pH 7.31, pCO2 63, RVP negative  Imaging wet read of CXR showing RLL opacity. However, this may have been present on previous CXR  In ED, treated with azithromycin, ceftriaxone, duoneb, solumedrol 125 mg, zofran.  Admitted for asthma exacerbation and possible pneumonia

## 2023-03-01 NOTE — ED ADULT NURSE NOTE - CHIEF COMPLAINT QUOTE
PRE-OP DATA and Check List - GI:  Procedure: EGD (63227) and ESD  Diagnosis: esophageal squamous cell carcinoma   Tentative Date: 11/17/2021  Tentative Time: 9 am  Duration of Procedure: 90 min  Anesthesia: General    Pre-Op Needed: yes *discussed with patient    Do not take vitamins/herbal supplements for 7 days prior to procedure. This includes omega-3's, fish oils, and iron supplements.    Do not take [id number] for 24 hours prior to your procedure. You may resume medication after procedure.       
Patient was called to discuss ESD and pre operative tasks to complete.  She was informed that date of 11/17/2021 is being saved for her EGD/ESD at 9 am with an 8 am arrival time.   She thanked RN for having a time so quickly.    RN discussed pre op visit with PCP, labs to be done before procedure, covid test 48-72 hours prior, clear liquid diet the day before procedure, and possible overnight stay in hospital after procedure.  She was informed that one of the clinic Mas will call to set up COVID test and that lab orders and referral for pre op would be entered for her.  Patient was instructed to call PCP office tomorrow for pre op appointment.    Medications and allergies reviewed with patient.     Patient verbalized understanding and offered no questions.  Clinic phone number provided.  
Pt BIBA for asthma exacerbation, took maintained inhaler at home without relief Denies CP

## 2023-03-01 NOTE — H&P ADULT - NSHPLABSRESULTS_GEN_ALL_CORE
LABS:  cret                        11.3   4.51  )-----------( 235      ( 28 Feb 2023 18:48 )             37.0     02-28    143  |  109  |  19  ----------------------------<  96  4.4   |  27  |  0.9    Ca    8.8      28 Feb 2023 18:48    TPro  6.4  /  Alb  4.0  /  TBili  0.3  /  DBili  x   /  AST  29  /  ALT  28  /  AlkPhos  98  02-28

## 2023-03-01 NOTE — PATIENT PROFILE ADULT - FALL HARM RISK - HARM RISK INTERVENTIONS

## 2023-03-01 NOTE — PATIENT PROFILE ADULT - NSPROPTRIGHTSUPPORTPHONE_GEN_A_NUR
Pt called office stating she tried to send edvice through portal but got message stating message was not sent.   She would like to discuss with you her hormone lab results.   She completed her [facility]. We should be receiving the results soon  Please call her at your earliest convenience   
[phone]

## 2023-03-01 NOTE — H&P ADULT - NSHPPHYSICALEXAM_GEN_ALL_CORE
GENERAL: NAD, obese, lying in bed comfortably, has 2 L NC  HEAD:  Atraumatic, Normocephalic  EYES: EOMI, PERRLA, conjunctiva and sclera clear  ENT: Moist mucous membranes  NECK: Supple, No JVD  CHEST/LUNG: Clear to auscultation bilaterally; No rales, rhonchi, wheezing, or rubs. Unlabored respirations. Decreased BS at bases  HEART: Regular rhythm, tachycardia; No murmurs, rubs, or gallops  ABDOMEN: BSx4; Soft, nontender, nondistended  EXTREMITIES:  2+ Peripheral Pulses, brisk capillary refill. No clubbing, cyanosis, or edema  NERVOUS SYSTEM:  A&Ox3, no focal deficits   SKIN: No rashes or lesions

## 2023-03-01 NOTE — H&P ADULT - ATTENDING COMMENTS
DR FLORES TO COMPLETE NOTE SAW PT TODAY 67F from home with PMH: Asthma on Albuterol, Symbicort 50/50, Obesity who presents with SOB and Chest Tightness associated with wheezing on exam. Pt states Pulmonologist is retired - Dr Dominguez and has not been able to see his replacement Irvin Kingston.   Pt states today she is feeling about 50% better than on presentation yesterday.   She has been treated with IV Solumedrol / Ceftriaxone   CXR: RLL Opacity  Limited Viral Panel in ED: Negative   No fever   POA    Vital Signs Last 24 Hrs  T(C): 35.7 (01 Mar 2023 04:15), Max: 37.1 (28 Feb 2023 16:40)  T(F): 96.3 (01 Mar 2023 04:15), Max: 98.7 (28 Feb 2023 16:40)  HR: 104 (01 Mar 2023 08:36) (89 - 110)  BP: 123/75 (01 Mar 2023 08:36) (113/65 - 141/83)  RR: 18 (01 Mar 2023 08:36) (18 - 21)  SpO2: 95% (01 Mar 2023 08:36) (93% - 95%)    Parameters below as of 01 Mar 2023 08:36  Patient On (Oxygen Delivery Method): nasal cannula  O2 Flow (L/min): 1    PHYSICAL EXAM:  GENERAL: NAD, well-developed  HEAD:  Atraumatic, Normocephalic  EYES: EOMI, PERRLA, conjunctiva and sclera clear  NECK: Supple, No JVD  CHEST/LUNG: Decreased BS B/L No wheezing   HEART: Sinus Tachycardia, No murmurs, rubs, or gallops  ABDOMEN: Soft, Nontender, Nondistended; Bowel sounds present  EXTREMITIES:  2+ Peripheral Pulses, No clubbing, cyanosis, or edema  PSYCH: AAOx3  NEUROLOGY: non-focal  SKIN: No rashes or lesions                        10.8   5.18  )-----------( 234      ( 01 Mar 2023 11:18 )             35.5     03-01    141  |  106  |  17  ----------------------------<  146<H>  4.5   |  26  |  0.8    Ca    8.9      01 Mar 2023 11:18    Mg     2.2     03-01    TPro  6.4  /  Alb  4.0  /  TBili  0.3  /  DBili  x   /  AST  29  /  ALT  28  /  AlkPhos  98  02-28    IMPRESSION:  Acute Hypercapnic Respiratory Failure   Acute Asthma Exarcerbation   RLL Bacterial PNA (CAP)  Obesity r/o JENNIFER vs OHS  Pulmonologist Dr Avila Retired     PLAN:  Oxygen for target Sats 88-92%   Bipap QHS   Ceftriaxone 1g IV qd x7d (can finish course of vantin oral if d/c)  Azithromycin 500mg po qd x5d  Solumedrol 60mg IV q8h  Duonebs q6h ATC and q4h/prn   Resume Symbicort Home Dose  Check Pro-zaina   Check D-Dimer if positive get Duplex B/L Legs   Check Urine Leg Ag   Check Urine Strep Pneumo Ag  Check Clamydia Ab/Ag  Monitor FS for any hyperglycemia given obesity and IV steroids   Check HA1C / TSH / Lipid profile   PULMONARY CONSULT ORDERED IRVIN ALAS     FULL CODE     GI/DVT proph

## 2023-03-02 LAB
LEGIONELLA AG UR QL: NEGATIVE — SIGNIFICANT CHANGE UP
NT-PROBNP SERPL-SCNC: 3719 PG/ML — HIGH (ref 0–300)
PROCALCITONIN SERPL-MCNC: 0.04 NG/ML — SIGNIFICANT CHANGE UP (ref 0.02–0.1)
S PNEUM AG UR QL: NEGATIVE — SIGNIFICANT CHANGE UP

## 2023-03-02 PROCEDURE — 99233 SBSQ HOSP IP/OBS HIGH 50: CPT

## 2023-03-02 PROCEDURE — 99222 1ST HOSP IP/OBS MODERATE 55: CPT

## 2023-03-02 PROCEDURE — 93306 TTE W/DOPPLER COMPLETE: CPT | Mod: 26

## 2023-03-02 RX ORDER — GUAIFENESIN/DEXTROMETHORPHAN 600MG-30MG
10 TABLET, EXTENDED RELEASE 12 HR ORAL ONCE
Refills: 0 | Status: COMPLETED | OUTPATIENT
Start: 2023-03-02 | End: 2023-03-02

## 2023-03-02 RX ORDER — FUROSEMIDE 40 MG
40 TABLET ORAL
Refills: 0 | Status: COMPLETED | OUTPATIENT
Start: 2023-03-02 | End: 2023-03-03

## 2023-03-02 RX ADMIN — Medication 325 MILLIGRAM(S): at 11:07

## 2023-03-02 RX ADMIN — Medication 3 MILLILITER(S): at 21:30

## 2023-03-02 RX ADMIN — Medication 10 MILLILITER(S): at 05:28

## 2023-03-02 RX ADMIN — Medication 40 MILLIGRAM(S): at 05:02

## 2023-03-02 RX ADMIN — Medication 3 MILLILITER(S): at 09:55

## 2023-03-02 RX ADMIN — AZITHROMYCIN 500 MILLIGRAM(S): 500 TABLET, FILM COATED ORAL at 11:07

## 2023-03-02 RX ADMIN — Medication 975 MILLIGRAM(S): at 22:44

## 2023-03-02 RX ADMIN — ENOXAPARIN SODIUM 40 MILLIGRAM(S): 100 INJECTION SUBCUTANEOUS at 05:02

## 2023-03-02 RX ADMIN — BUDESONIDE AND FORMOTEROL FUMARATE DIHYDRATE 2 PUFF(S): 160; 4.5 AEROSOL RESPIRATORY (INHALATION) at 10:48

## 2023-03-02 RX ADMIN — BUDESONIDE AND FORMOTEROL FUMARATE DIHYDRATE 2 PUFF(S): 160; 4.5 AEROSOL RESPIRATORY (INHALATION) at 21:22

## 2023-03-02 RX ADMIN — Medication 40 MILLIGRAM(S): at 15:40

## 2023-03-02 RX ADMIN — MONTELUKAST 10 MILLIGRAM(S): 4 TABLET, CHEWABLE ORAL at 11:07

## 2023-03-02 NOTE — PROGRESS NOTE ADULT - ASSESSMENT
67-year-old female past medical history of asthma with multiple hospitalizations without intubation, last was 15 years ago, iron deficiency anemia presents with SOB. History goes back to 3 weeks ago where patient had SOB with cough and had to use her Symbicort and albuterol rescue inhaler more often. Patient use her Symbicort inhaler twice daily at baseline. Over the last 3 days her shortness of breath acutely worsened where she is using her albuterol inhaler every 15 to 20 minutes with no relief. Her oxygen saturation is 95% at baseline, but it decreased to 92%. Patient was picked up by EMS and given DuoNebs that made her feel better.  No nausea, vomiting, fever, chest pain, back pain, abdominal pain. No sputum production.    In ED, treated with azithromycin, ceftriaxone, duoneb, solumedrol 125 mg, zofran.  Admitted for asthma exacerbation and possible pneumonia    #SOB likely due to Asthma exacerbation, possible pneumonia  - CXR b/l increase interstitial markings  - VBG pCO2 elevated  - no wheezing on my exam  - Albuterol, symbicort, duonebs  - prednisone 40 mg 5 days  - pro-calcitonin 0.04 (negative)  - D/c abx (as per ulm recs)  - BIPAP at night for CO2 retention as needed  - repeat VBG to measure pCO2  - check pro-BNP   - If pro-BNP is elevated give lasix and get TTE    #Iron deficiency anemia  - ferrous sulfate daily    #Diet- regular  #DVT prophylaxis- lovenox 40 mg daily  Pending- wean off oxygen, resolution of chest tightness and SOB

## 2023-03-02 NOTE — PROGRESS NOTE ADULT - SUBJECTIVE AND OBJECTIVE BOX
SUBJECTIVE:    Patient is a 67y old Female who presents with a chief complaint of asthma exacerbation, pneumonia (02 Mar 2023 08:05)    Currently admitted to medicine with the primary diagnosis of Acute on chronic respiratory failure with hypercapnia       Today is hospital day 2d. This morning she is resting comfortably in bed and reports no new issues or overnight events.     PAST MEDICAL & SURGICAL HISTORY  Asthma    HTN (hypertension)    H/O total knee replacement      SOCIAL HISTORY:  Negative for smoking/alcohol/drug use.     ALLERGIES:  [This allergen will not trigger allergy alert] environmental allegies (Unknown)  No Known Drug Allergies    MEDICATIONS:  STANDING MEDICATIONS  albuterol    90 MICROgram(s) HFA Inhaler 2 Puff(s) Inhalation two times a day  albuterol/ipratropium for Nebulization 3 milliLiter(s) Nebulizer every 6 hours  azithromycin   Tablet 500 milliGRAM(s) Oral daily  budesonide 160 MICROgram(s)/formoterol 4.5 MICROgram(s) Inhaler 2 Puff(s) Inhalation two times a day  cefTRIAXone   IVPB 1000 milliGRAM(s) IV Intermittent every 24 hours  enoxaparin Injectable 40 milliGRAM(s) SubCutaneous every 24 hours  ferrous    sulfate 325 milliGRAM(s) Oral daily  influenza  Vaccine (HIGH DOSE) 0.7 milliLiter(s) IntraMuscular once  montelukast 10 milliGRAM(s) Oral daily  predniSONE   Tablet 40 milliGRAM(s) Oral daily    PRN MEDICATIONS    VITALS:   T(F): 96.7  HR: 91  BP: 115/75  RR: 18  SpO2: 95%    LABS:                        10.8   5.18  )-----------( 234      ( 01 Mar 2023 11:18 )             35.5     03-01    141  |  106  |  17  ----------------------------<  146<H>  4.5   |  26  |  0.8    Ca    8.9      01 Mar 2023 11:18  Mg     2.2     03-01    TPro  6.4  /  Alb  4.0  /  TBili  0.3  /  DBili  x   /  AST  29  /  ALT  28  /  AlkPhos  98  02-28      CARDIAC MARKERS ( 28 Feb 2023 18:48 )  x     / <0.01 ng/mL / x     / x     / x          RADIOLOGY:    PHYSICAL EXAM:  GEN: No acute distress  LUNGS: No wheezing, decreased air entry over bases, fing bilateral crackles  HEART: Regular S1S@  ABD: Soft, non-tender, non-distended.  EXT: NC/NC/NE/2+PP/DALTON/Skin Intact.   NEURO: AAOX3    Intravenous access:   NG tube:   Schaeffer Catheter:

## 2023-03-02 NOTE — CONSULT NOTE ADULT - ASSESSMENT
IMPRESSION:    Acute hypoxemic respiratory failure  Acute asthma exacerbation  Probable community acquired pneumonia  Obesity, likely JENNIFER/OHS    RECOMMENDATIONS:    CXR reviewed. Has RLL opacity.  Cover for community acquired pneumonia and get procalcitonin, Urine streptococcal and Legionella Ag.  Wean supplemental O2 to keep O2 sat between 92% - 96%  Continue with Solumedrol 60mg q 8 and maritza it down when wheezing improves, prescribe prednisone taper for home.  NIV QHS  Duoneb q6, and PRN  Continue with Singulair  Keep serum Mag >2  Monitor PEF q 24 for now  Counselled about asthma  Close OP Pulm follow up       IMPRESSION:    Acute hypoxemic respiratory failure  Acute asthma exacerbation  Severe persistent asthma   Pulmonary edema  Probable community acquired pneumonia  Obesity, likely JENNIFER/OHS    RECOMMENDATIONS:    CXR reviewed. Has RLL opacity with bilateral increased interstitial markings. Likely pulmonary edema.  Cover for community acquired pneumonia and get procalcitonin, Urine streptococcal and Legionella Ag. If procalcitonin is negative dc antibiotics  Get pro-BNP  Wean supplemental O2 to keep O2 sat between 92% - 96%  Prednisone 40 mg for 5 days  NIV QHS  Duoneb PRN, restart Symbicort 160/4.5 two puffs BID. Add Spiriva as a step up therapy  Continue with Singulair  Keep serum Mag >2  Monitor PEF q 24 for now  Counselled about asthma  Close OP Pulm follow up       IMPRESSION:    Acute hypoxemic respiratory failure - resolved   Acute asthma exacerbation - resolved   Severe persistent asthma   Pulmonary edema  Obesity, likely JENNIFER/OHS    RECOMMENDATIONS:    CXR reviewed. Has bilateral increased interstitial markings. Likely pulmonary edema.  Procal noted and is normal. No leukocytosis.   Get pro-BNP; check echocardiogram   Off oxygen now   Prednisone 40 mg taper down over 10 days   Evidence of hypercapnia on VBG; chronic compensated  Will repeat sleep study; possible need for BIPAP during sleep outpatient   Duoneb PRN, restart Symbicort 160/4.5 two puffs BID. Add Spiriva as a step up therapy  Continue with Singulair  Keep serum Mag >2  Monitor PEF q 24 for now  Outpatient pulm follow up

## 2023-03-02 NOTE — CONSULT NOTE ADULT - SUBJECTIVE AND OBJECTIVE BOX
Patient is a 67y old  Female who presents with a chief complaint of asthma exacerbation, pneumonia (01 Mar 2023 00:42)      HPI:  67-year-old female past medical history of asthma with multiple hospitalizations without intubation, last was 15 years ago, presents with SOB. History goes back to 3 weeks ago where patient had SOB with cough, chest tightness and had to use her Symbicort and albuterol rescue inhaler more often. Patient use her Symbicort inhaler twice daily at baseline. Over the last 3 days her shortness of breath acutely worsened where she is using her albuterol inhaler every 15 to 20 minutes with no relief. Her oxygen saturation is 95% at baseline, but it decreased to 92%. Patient was picked up by EMS and given DuoNebs that made her feel better.  No nausea, vomiting, fever, chest pain, back pain, abdominal pain.  No sputum production.    In ED VS insignifcant except for , oxygen saturation 95% RA  Labs significant for VBG pH 7.31, pCO2 63, RVP negative  Imaging wet read of CXR showing RLL opacity. However, this may have been present on previous CXR  In ED, treated with azithromycin, ceftriaxone, duoneb, solumedrol 125 mg, zofran.  Admitted for asthma exacerbation and possible pneumonia   (01 Mar 2023 00:42)      PAST MEDICAL & SURGICAL HISTORY:  Asthma      HTN (hypertension)      H/O total knee replacement          SOCIAL HX:   Smoking                         ETOH                            Other    FAMILY HISTORY:  .  No cardiovascular or pulmonary family history     REVIEW OF SYSTEMS:    All ROS are negative exept per HPI       Allergies    [This allergen will not trigger allergy alert] environmental allegies (Unknown)  No Known Drug Allergies    Intolerances          PHYSICAL EXAM  Vital Signs Last 24 Hrs  T(C): 35.9 (02 Mar 2023 07:52), Max: 36.8 (02 Mar 2023 03:46)  T(F): 96.7 (02 Mar 2023 07:52), Max: 98.3 (02 Mar 2023 03:46)  HR: 91 (02 Mar 2023 07:52) (87 - 104)  BP: 115/75 (02 Mar 2023 07:52) (103/58 - 123/75)  BP(mean): --  RR: 18 (02 Mar 2023 07:52) (17 - 18)  SpO2: 95% (02 Mar 2023 07:52) (95% - 96%)    Parameters below as of 01 Mar 2023 08:36  Patient On (Oxygen Delivery Method): nasal cannula  O2 Flow (L/min): 1      CONSTITUTIONAL:  In NAD    ENT:   Airway patent,     EYES:   Clear bilaterally,   pupils equal,   round and reactive to light.    CARDIAC:   Normal rate,   regular rhythm.    no edema    RESPIRATORY:   No wheezing   Normal chest expansion  Not tachypneic,  No use of accessory muscles    GASTROINTESTINAL:  Abdomen soft, non-tender,   No guarding,   Positive BS    MUSCULOSKELETAL:   No clubbing, cyanosis    NEUROLOGICAL:   Alert and oriented     SKIN:   Skin normal color for race,             LABS:                          10.8   5.18  )-----------( 234      ( 01 Mar 2023 11:18 )             35.5                                               03-01    141  |  106  |  17  ----------------------------<  146<H>  4.5   |  26  |  0.8    Ca    8.9      01 Mar 2023 11:18  Mg     2.2     03-01    TPro  6.4  /  Alb  4.0  /  TBili  0.3  /  DBili  x   /  AST  29  /  ALT  28  /  AlkPhos  98  02-28                                                 CARDIAC MARKERS ( 28 Feb 2023 18:48 )  x     / <0.01 ng/mL / x     / x     / x                                                LIVER FUNCTIONS - ( 28 Feb 2023 18:48 )  Alb: 4.0 g/dL / Pro: 6.4 g/dL / ALK PHOS: 98 U/L / ALT: 28 U/L / AST: 29 U/L / GGT: x                                                                                                MEDICATIONS  (STANDING):  albuterol    90 MICROgram(s) HFA Inhaler 2 Puff(s) Inhalation two times a day  albuterol/ipratropium for Nebulization 3 milliLiter(s) Nebulizer every 6 hours  azithromycin   Tablet 500 milliGRAM(s) Oral daily  budesonide 160 MICROgram(s)/formoterol 4.5 MICROgram(s) Inhaler 2 Puff(s) Inhalation two times a day  cefTRIAXone   IVPB 1000 milliGRAM(s) IV Intermittent every 24 hours  enoxaparin Injectable 40 milliGRAM(s) SubCutaneous every 24 hours  ferrous    sulfate 325 milliGRAM(s) Oral daily  influenza  Vaccine (HIGH DOSE) 0.7 milliLiter(s) IntraMuscular once  montelukast 10 milliGRAM(s) Oral daily  predniSONE   Tablet 40 milliGRAM(s) Oral daily    MEDICATIONS  (PRN):     Patient is a 67y old  Female who presents with a chief complaint of asthma exacerbation, pneumonia (01 Mar 2023 00:42)      HPI:  67-year-old female past medical history of asthma with multiple hospitalizations without intubation, last was 15 years ago, presents with SOB. History goes back to 3 weeks ago where patient had SOB with cough, chest tightness and had to use her Symbicort and albuterol rescue inhaler more often. Patient use her Symbicort inhaler twice daily at baseline. Over the last 3 days her shortness of breath acutely worsened where she is using her albuterol inhaler every 15 to 20 minutes with no relief. Her oxygen saturation is 95% at baseline, but it decreased to 92%. Patient was picked up by EMS and given DuoNebs that made her feel better.  No nausea, vomiting, fever, chest pain, back pain, abdominal pain.  No sputum production.    In ED VS insignifcant except for , oxygen saturation 95% RA  Labs significant for VBG pH 7.31, pCO2 63, RVP negative  Imaging wet read of CXR showing RLL opacity. However, this may have been present on previous CXR  In ED, treated with azithromycin, ceftriaxone, duoneb, solumedrol 125 mg, zofran.  Admitted for asthma exacerbation and possible pneumonia   (01 Mar 2023 00:42)      PAST MEDICAL & SURGICAL HISTORY:  Asthma      HTN (hypertension)      H/O total knee replacement          SOCIAL HX:   Smoking smoked 4 years and quit                         ETOH                            Other    FAMILY HISTORY:  .  No cardiovascular or pulmonary family history     REVIEW OF SYSTEMS:    All ROS are negative exept per HPI       Allergies    [This allergen will not trigger allergy alert] environmental allegies (Unknown)  No Known Drug Allergies    Intolerances          PHYSICAL EXAM  Vital Signs Last 24 Hrs  T(C): 35.9 (02 Mar 2023 07:52), Max: 36.8 (02 Mar 2023 03:46)  T(F): 96.7 (02 Mar 2023 07:52), Max: 98.3 (02 Mar 2023 03:46)  HR: 91 (02 Mar 2023 07:52) (87 - 104)  BP: 115/75 (02 Mar 2023 07:52) (103/58 - 123/75)  BP(mean): --  RR: 18 (02 Mar 2023 07:52) (17 - 18)  SpO2: 95% (02 Mar 2023 07:52) (95% - 96%)    Parameters below as of 01 Mar 2023 08:36  Patient On (Oxygen Delivery Method): nasal cannula  O2 Flow (L/min): 1      CONSTITUTIONAL:  In NAD    ENT:   Airway patent,     EYES:   Clear bilaterally,   pupils equal,   round and reactive to light.    CARDIAC:   Normal rate,   regular rhythm.    no edema    RESPIRATORY:   No wheezing   Normal chest expansion  Not tachypneic,  No use of accessory muscles    GASTROINTESTINAL:  Abdomen soft, non-tender,   No guarding,   Positive BS    MUSCULOSKELETAL:   No clubbing, cyanosis    NEUROLOGICAL:   Alert and oriented     SKIN:   Skin normal color for race,             LABS:                          10.8   5.18  )-----------( 234      ( 01 Mar 2023 11:18 )             35.5                                               03-01    141  |  106  |  17  ----------------------------<  146<H>  4.5   |  26  |  0.8    Ca    8.9      01 Mar 2023 11:18  Mg     2.2     03-01    TPro  6.4  /  Alb  4.0  /  TBili  0.3  /  DBili  x   /  AST  29  /  ALT  28  /  AlkPhos  98  02-28                                                 CARDIAC MARKERS ( 28 Feb 2023 18:48 )  x     / <0.01 ng/mL / x     / x     / x                                                LIVER FUNCTIONS - ( 28 Feb 2023 18:48 )  Alb: 4.0 g/dL / Pro: 6.4 g/dL / ALK PHOS: 98 U/L / ALT: 28 U/L / AST: 29 U/L / GGT: x                                                                                                MEDICATIONS  (STANDING):  albuterol    90 MICROgram(s) HFA Inhaler 2 Puff(s) Inhalation two times a day  albuterol/ipratropium for Nebulization 3 milliLiter(s) Nebulizer every 6 hours  azithromycin   Tablet 500 milliGRAM(s) Oral daily  budesonide 160 MICROgram(s)/formoterol 4.5 MICROgram(s) Inhaler 2 Puff(s) Inhalation two times a day  cefTRIAXone   IVPB 1000 milliGRAM(s) IV Intermittent every 24 hours  enoxaparin Injectable 40 milliGRAM(s) SubCutaneous every 24 hours  ferrous    sulfate 325 milliGRAM(s) Oral daily  influenza  Vaccine (HIGH DOSE) 0.7 milliLiter(s) IntraMuscular once  montelukast 10 milliGRAM(s) Oral daily  predniSONE   Tablet 40 milliGRAM(s) Oral daily    MEDICATIONS  (PRN):

## 2023-03-03 PROCEDURE — 71045 X-RAY EXAM CHEST 1 VIEW: CPT | Mod: 26

## 2023-03-03 PROCEDURE — 99223 1ST HOSP IP/OBS HIGH 75: CPT

## 2023-03-03 PROCEDURE — 99233 SBSQ HOSP IP/OBS HIGH 50: CPT

## 2023-03-03 RX ORDER — FUROSEMIDE 40 MG
40 TABLET ORAL
Refills: 0 | Status: DISCONTINUED | OUTPATIENT
Start: 2023-03-03 | End: 2023-03-04

## 2023-03-03 RX ADMIN — Medication 325 MILLIGRAM(S): at 12:06

## 2023-03-03 RX ADMIN — MONTELUKAST 10 MILLIGRAM(S): 4 TABLET, CHEWABLE ORAL at 12:06

## 2023-03-03 RX ADMIN — Medication 40 MILLIGRAM(S): at 13:25

## 2023-03-03 RX ADMIN — Medication 3 MILLILITER(S): at 09:23

## 2023-03-03 RX ADMIN — BUDESONIDE AND FORMOTEROL FUMARATE DIHYDRATE 2 PUFF(S): 160; 4.5 AEROSOL RESPIRATORY (INHALATION) at 22:49

## 2023-03-03 RX ADMIN — AZITHROMYCIN 500 MILLIGRAM(S): 500 TABLET, FILM COATED ORAL at 12:06

## 2023-03-03 RX ADMIN — ENOXAPARIN SODIUM 40 MILLIGRAM(S): 100 INJECTION SUBCUTANEOUS at 05:00

## 2023-03-03 RX ADMIN — Medication 40 MILLIGRAM(S): at 05:01

## 2023-03-03 RX ADMIN — BUDESONIDE AND FORMOTEROL FUMARATE DIHYDRATE 2 PUFF(S): 160; 4.5 AEROSOL RESPIRATORY (INHALATION) at 08:26

## 2023-03-03 NOTE — CONSULT NOTE ADULT - SUBJECTIVE AND OBJECTIVE BOX
HISTORY OF PRESENT ILLNESS:  67-year-old female past medical history of asthma with multiple hospitalizations without intubation, last was 15 years ago, presents with SOB. History goes back to 3 weeks ago where patient had SOB with cough, chest tightness and had to use her Symbicort and albuterol rescue inhaler more often. Patient use her Symbicort inhaler twice daily at baseline. Over the last 3 days her shortness of breath acutely worsened where she is using her albuterol inhaler every 15 to 20 minutes with no relief. Her oxygen saturation is 95% at baseline, but it decreased to 92%. Patient was picked up by EMS and given DuoNebs that made her feel better.  No nausea, vomiting, fever, chest pain, back pain, abdominal pain.  No sputum production.    In ED VS insignifcant except for , oxygen saturation 95% RA  Labs significant for VBG pH 7.31, pCO2 63, RVP negative  Imaging wet read of CXR showing RLL opacity. However, this may have been present on previous CXR  In ED, treated with azithromycin, ceftriaxone, duoneb, solumedrol 125 mg, zofran.  Admitted for asthma exacerbation and possible pneumonia   (01 Mar 2023 00:42)    Cardiology Fellow Notes    Patient without known cardiac history   Presented for shortness of breath and treated for asthma and pneumonia  Also had congestion and was diuresed     Eventually workup found an elevated BNP and an echo showed an globally decreased EF of 20% and severe MR.     Currently the patient is feeling well. She lying flat. No shortness of breath or orthopnea.   Mild LE swelling    PAST MEDICAL & SURGICAL HISTORY  Asthma    HTN (hypertension)    H/O total knee replacement        FAMILY HISTORY:  FAMILY HISTORY:      SOCIAL HISTORY:  Social History:  Smoked for a few years a pack every few days, social drinker, no drugs (01 Mar 2023 00:42)      ALLERGIES:  [This allergen will not trigger allergy alert] environmental allegies (Unknown)  No Known Drug Allergies      MEDICATIONS:  albuterol    90 MICROgram(s) HFA Inhaler 2 Puff(s) Inhalation two times a day  albuterol/ipratropium for Nebulization 3 milliLiter(s) Nebulizer every 6 hours  budesonide 160 MICROgram(s)/formoterol 4.5 MICROgram(s) Inhaler 2 Puff(s) Inhalation two times a day  enoxaparin Injectable 40 milliGRAM(s) SubCutaneous every 24 hours  ferrous    sulfate 325 milliGRAM(s) Oral daily  furosemide   Injectable 40 milliGRAM(s) IV Push two times a day  influenza  Vaccine (HIGH DOSE) 0.7 milliLiter(s) IntraMuscular once  montelukast 10 milliGRAM(s) Oral daily  predniSONE   Tablet 40 milliGRAM(s) Oral daily    PRN:      HOME MEDICATIONS:  Home Medications:  Albuterol (Eqv-Ventolin HFA) 90 mcg/inh inhalation aerosol: 2 puff(s) inhaled every 6 hours (01 Mar 2023 01:35)  albuterol 0.63 mg/3 mL (0.021%) inhalation solution:  (18 Mar 2018 10:02)  Singulair: orally once a day (18 Mar 2018 10:02)  Symbicort 160 mcg-4.5 mcg/inh inhalation aerosol: 2 puff(s) inhaled 2 times a day (01 Mar 2023 01:33)      VITALS:   T(F): 97.8 (03-03 @ 07:45), Max: 98.7 (02-28 @ 16:40)  HR: 97 (03-03 @ 07:45) (87 - 110)  BP: 124/75 (03-03 @ 07:45) (103/58 - 142/88)  BP(mean): --  RR: 18 (03-03 @ 07:45) (17 - 21)  SpO2: 98% (03-03 @ 07:45) (93% - 98%)    I&O's Summary      REVIEW OF SYSTEMS:  CONSTITUTIONAL: No weakness, fevers or chills  HEENT: No visual changes, neck/ear pain  RESPIRATORY: No cough, sob  CARDIOVASCULAR: See HPI  GASTROINTESTINAL: No abdominal pain. No nausea, vomiting, diarrhea   GENITOURINARY: No dysuria, frequency or hematuria  NEUROLOGICAL: No new focal deficits  SKIN: No new rashes    PHYSICAL EXAM:  General: Not in distress.  Non-toxic appearing.   Cardio: regular, S1, S2, systolic murmur  Pulm: B/L BS.  No wheezing / crackles / rales  Abdomen: Soft, non-tender, non-distended. Normoactive bowel sounds  Extremities: 1+ edema b/l le  Neuro: A&O x3. No focal deficits    LABS:      Troponin trend:    Serum Pro-Brain Natriuretic Peptide: 3719 pg/mL (03-02-23 @ 11:21)    IMAGING:  -TTE:  < from: TTE Echo Complete w/ Contrast w/ Doppler (03.02.23 @ 14:38) >  Summary:   1. Left ventricular ejection fraction, by visual estimation, is 20 to   25%.   2. Severely decreased global left ventricular systolic function.   3. Mildly enlarged left atrium.   4. Normal right atrial size.   5. Mild thickening of the anterior and posterior mitral valve leaflets.   6. Severe mitral valve regurgitation.   7. Mild tricuspid regurgitation.   8. Mild aortic regurgitation.   9. Endocardial visualization was enhanced with intravenous echo contrast.    ECG:  NSR   Non specific ST changes   No Q waves     HISTORY OF PRESENT ILLNESS:  67-year-old female past medical history of asthma with multiple hospitalizations without intubation, last was 15 years ago, presents with SOB. History goes back to 3 weeks ago where patient had SOB with cough, chest tightness and had to use her Symbicort and albuterol rescue inhaler more often. Patient use her Symbicort inhaler twice daily at baseline. Over the last 3 days her shortness of breath acutely worsened where she is using her albuterol inhaler every 15 to 20 minutes with no relief. Her oxygen saturation is 95% at baseline, but it decreased to 92%. Patient was picked up by EMS and given DuoNebs that made her feel better.  No nausea, vomiting, fever, chest pain, back pain, abdominal pain.  No sputum production.    In ED VS insignifcant except for , oxygen saturation 95% RA  Labs significant for VBG pH 7.31, pCO2 63, RVP negative  Imaging wet read of CXR showing RLL opacity. However, this may have been present on previous CXR  In ED, treated with azithromycin, ceftriaxone, duoneb, solumedrol 125 mg, zofran.  Admitted for asthma exacerbation and possible pneumonia   (01 Mar 2023 00:42)      Cardiology Fellow Notes  Patient without known cardiac history   Presented for shortness of breath and treated for asthma and pneumonia  Also had congestion and was diuresed     Eventually workup found an elevated BNP and an echo showed an globally decreased EF of 20% and severe MR.     Currently the patient is feeling well. She lying flat. No shortness of breath or orthopnea.   Mild LE swelling      PAST MEDICAL & SURGICAL HISTORY  Asthma    HTN (hypertension)    H/O total knee replacement      No pertinent family history of premature CAD in first degree relatives    SOCIAL HISTORY:  Smoked for a few years a pack every few days, social drinker, no drugs (01 Mar 2023 00:42)    No Known Drug Allergies      MEDICATIONS:  albuterol    90 MICROgram(s) HFA Inhaler 2 Puff(s) Inhalation two times a day  albuterol/ipratropium for Nebulization 3 milliLiter(s) Nebulizer every 6 hours  budesonide 160 MICROgram(s)/formoterol 4.5 MICROgram(s) Inhaler 2 Puff(s) Inhalation two times a day  enoxaparin Injectable 40 milliGRAM(s) SubCutaneous every 24 hours  ferrous    sulfate 325 milliGRAM(s) Oral daily  furosemide   Injectable 40 milliGRAM(s) IV Push two times a day  influenza  Vaccine (HIGH DOSE) 0.7 milliLiter(s) IntraMuscular once  montelukast 10 milliGRAM(s) Oral daily  predniSONE   Tablet 40 milliGRAM(s) Oral daily      HOME MEDICATIONS:  Home Medications:  Albuterol (Eqv-Ventolin HFA) 90 mcg/inh inhalation aerosol: 2 puff(s) inhaled every 6 hours (01 Mar 2023 01:35)  albuterol 0.63 mg/3 mL (0.021%) inhalation solution:  (18 Mar 2018 10:02)  Singulair: orally once a day (18 Mar 2018 10:02)  Symbicort 160 mcg-4.5 mcg/inh inhalation aerosol: 2 puff(s) inhaled 2 times a day (01 Mar 2023 01:33)      VITALS:   T(F): 97.8 (03-03 @ 07:45), Max: 98.7 (02-28 @ 16:40)  HR: 97 (03-03 @ 07:45) (87 - 110)  BP: 124/75 (03-03 @ 07:45) (103/58 - 142/88)  BP(mean): --  RR: 18 (03-03 @ 07:45) (17 - 21)  SpO2: 98% (03-03 @ 07:45) (93% - 98%)      REVIEW OF SYSTEMS:  CONSTITUTIONAL: No fever, weight loss, fatigue  NECK: No pain or stiffness  RESPIRATORY: See HPI  CARDIOVASCULAR: See HPI  GASTROINTESTINAL: No abdominal/epigastric pain, nausea, vomiting, hematemesis, diarrhea, constipation, melena or hematochezia  GENITOURINARY: No dysuria, frequency, hematuria, incontinence  NEUROLOGICAL: No headaches, memory loss, loss of strength, numbness, tremors  SKIN: No itching, burning, rashes, lesions   ENDOCRINE: No heat/cold intolerance or hair loss  MUSCULOSKELETAL: No joint pain or swelling  HEME/LYMPH: No easy bruising or bleeding gums    PHYSICAL EXAM:  General: Not in distress.  Non-toxic appearing.   Cardio: regular, S1, S2, systolic murmur  Pulm: B/L BS.  No wheezing / crackles / rales  Abdomen: Soft, non-tender, non-distended  Extremities: 1+ edema b/l le  Neuro: A&O x3. No focal deficits      Serum Pro-Brain Natriuretic Peptide: 3719 pg/mL (03-02-23 @ 11:21)      IMAGING:    < from: TTE Echo Complete w/ Contrast w/ Doppler (03.02.23 @ 14:38) >  Summary:   1. Left ventricular ejection fraction, by visual estimation, is 20 to   25%.   2. Severely decreased global left ventricular systolic function.   3. Mildly enlarged left atrium.   4. Normal right atrial size.   5. Mild thickening of the anterior and posterior mitral valve leaflets.   6. Severe mitral valve regurgitation.   7. Mild tricuspid regurgitation.   8. Mild aortic regurgitation.   9. Endocardial visualization was enhanced with intravenous echo contrast.      ECG:  NSR   Non specific ST changes   No Q waves

## 2023-03-03 NOTE — PROGRESS NOTE ADULT - SUBJECTIVE AND OBJECTIVE BOX
SUBJECTIVE:    Patient is a 67y old Female who presents with a chief complaint of asthma exacerbation, pneumonia (02 Mar 2023 11:12)    Currently admitted to medicine with the primary diagnosis of Acute on chronic respiratory failure with hypercapnia       Today is hospital day 3d. This morning she is resting comfortably in bed and reports no new issues or overnight events.     PAST MEDICAL & SURGICAL HISTORY  Asthma    HTN (hypertension)    H/O total knee replacement      SOCIAL HISTORY:  Negative for smoking/alcohol/drug use.     ALLERGIES:  [This allergen will not trigger allergy alert] environmental allegies (Unknown)  No Known Drug Allergies    MEDICATIONS:  STANDING MEDICATIONS  albuterol    90 MICROgram(s) HFA Inhaler 2 Puff(s) Inhalation two times a day  albuterol/ipratropium for Nebulization 3 milliLiter(s) Nebulizer every 6 hours  budesonide 160 MICROgram(s)/formoterol 4.5 MICROgram(s) Inhaler 2 Puff(s) Inhalation two times a day  enoxaparin Injectable 40 milliGRAM(s) SubCutaneous every 24 hours  ferrous    sulfate 325 milliGRAM(s) Oral daily  furosemide   Injectable 40 milliGRAM(s) IV Push two times a day  influenza  Vaccine (HIGH DOSE) 0.7 milliLiter(s) IntraMuscular once  montelukast 10 milliGRAM(s) Oral daily  predniSONE   Tablet 40 milliGRAM(s) Oral daily    PRN MEDICATIONS    VITALS:   T(F): 97.8  HR: 97  BP: 124/75  RR: 18  SpO2: 98%    LABS:        Culture - Legionella (collected 01 Mar 2023 13:15)  Source: .Legionella None  Preliminary Report (02 Mar 2023 22:56):    Culture in progress          RADIOLOGY:    PHYSICAL EXAM:  GEN: No acute distress  LUNGS: Clear to auscultation bilaterally   HEART: Regular S1S2  ABD: Soft, non-tender, non-distended.  EXT: NC/NC/NE/2+PP/DALTON/Skin Intact.   NEURO: AAOX3    Intravenous access:   NG tube:   Schaeffer Catheter:

## 2023-03-03 NOTE — CONSULT NOTE ADULT - ASSESSMENT
Impression   # ADHF   # HFrEF   # Severe MR    Recommendations   - Please repeat a second level of troponin   - Continue IV diuresis today and change to oral tomorrow as patient is close to euvolemic  - She will likely need a MARISOL and a cardiac catheterization  - If BP elevated consider starting an ARB or CCB     This a preliminary plan. Will need to discuss with attending.   Signature: Yohan BENIETS, 1st year Cardiology Fellow   Impression   # ADHF   # HFrEF   # Severe MR    Recommendations   - Please repeat a second level of troponin   - Continue IV diuresis today and change to oral tomorrow as patient is close to euvolemic  - Plan for catheterization on Monday. +/- MARISOL on Monday. Please keep NPO aftermidnight on Sunday. Make sure COVID is repeated during the weekend.   - Recommend starting beta blocker (metoprolol 12.5 BID), if BP tolerates would add Entresto     Discussed with attending.   Signature: Yohan BENITES, 1st year Cardiology Fellow     Impression   # Acute HFrEF 20-25%  # Severe MR    CXR 3/3:  +PVC    Recommendations   - Trend troponin   - Continue IV diuresis   - Start Metoprolol 12.5 mg BID  - Start Entresto if BP tolerates  - Plan for catheterization on Monday. +/- MARISOL on Monday.   - NPO after midnight on Sunday  - Repeat COVID PCR      Discussed with attending.   Signature: Yohan BENITES, 1st year Cardiology Fellow

## 2023-03-03 NOTE — PROGRESS NOTE ADULT - ASSESSMENT
67-year-old female past medical history of asthma with multiple hospitalizations without intubation, last was 15 years ago, iron deficiency anemia presents with SOB. History goes back to 3 weeks ago where patient had SOB with cough and had to use her Symbicort and albuterol rescue inhaler more often. Patient use her Symbicort inhaler twice daily at baseline. Over the last 3 days her shortness of breath acutely worsened where she is using her albuterol inhaler every 15 to 20 minutes with no relief. Her oxygen saturation is 95% at baseline, but it decreased to 92%. Patient was picked up by EMS and given DuoNebs that made her feel better.  No nausea, vomiting, fever, chest pain, back pain, abdominal pain. No sputum production.    Admitted for asthma exacerbation and possible pneumonia    #SOB likely due to Asthma exacerbation, possible pneumonia  #R/o HF  - CXR b/l increase interstitial markings  - VBG pCO2 elevated  - no wheezing on my exam  - Albuterol, symbicort, duonebs  - prednisone 40 mg 5 days  - pro-calcitonin 0.04 (negative)  - PRo BNP  elevated  - lasixc 40mg IV bid  - c/w azithromycin and d/c ceftriaxone  - f/u on TTE   #Iron deficiency anemia  - ferrous sulfate daily    #Diet- regular  #DVT prophylaxis- lovenox 40 mg daily  Pending- wean off oxygen, resolution of chest tightness and SOB     67-year-old female past medical history of asthma with multiple hospitalizations without intubation, last was 15 years ago, iron deficiency anemia presents with SOB. History goes back to 3 weeks ago where patient had SOB with cough and had to use her Symbicort and albuterol rescue inhaler more often. Patient use her Symbicort inhaler twice daily at baseline. Over the last 3 days her shortness of breath acutely worsened where she is using her albuterol inhaler every 15 to 20 minutes with no relief. Her oxygen saturation is 95% at baseline, but it decreased to 92%. Patient was picked up by EMS and given DuoNebs that made her feel better.  No nausea, vomiting, fever, chest pain, back pain, abdominal pain. No sputum production.    #SOB likely dueCHF exacerbation  # Newly diagnosed HFrEF  - CXR b/l increase interstitial markings  - VBG pCO2 elevated  - no wheezing on my exam  - Albuterol, symbicort, duonebs  - prednisone 40 mg 5 days  - pro-calcitonin 0.04 (negative)  - PRo BNP  elevated  - lasixc 40mg IV bid  - c/w azithromycin and d/c ceftriaxone  - TTE LVEF 20-25%, Severe MR  - accurate in/output  - Cadriology consult for work up of CHF     #Iron deficiency anemia  - ferrous sulfate daily    #Diet- regular  #DVT prophylaxis- lovenox 40 mg daily  Pending- wean off oxygen, resolution of chest tightness and SOB

## 2023-03-03 NOTE — CHART NOTE - NSCHARTNOTEFT_GEN_A_CORE
TRANSFER NOTE  From   To [facility] Course:    HPI:  67-year-old female past medical history of asthma with multiple hospitalizations without intubation, last was 15 years ago, presents with SOB. History goes back to 3 weeks ago where patient had SOB with cough, chest tightness and had to use her Symbicort and albuterol rescue inhaler more often. Patient use her Symbicort inhaler twice daily at baseline. Over the last 3 days her shortness of breath acutely worsened where she is using her albuterol inhaler every 15 to 20 minutes with no relief. Her oxygen saturation is 95% at baseline, but it decreased to 92%. Patient was picked up by EMS and given DuoNebs that made her feel better.  No nausea, vomiting, fever, chest pain, back pain, abdominal pain.  No sputum production.    In ED VS insignifcant except for , oxygen saturation 95% RA  Labs significant for VBG pH 7.31, pCO2 63, RVP negative  Imaging wet read of CXR showing Bilateral interstitial edema  In ED, treated with azithromycin, ceftriaxone, duoneb, solumedrol 125 mg, zofran.  Leonor was admitted for suspected asthma exacerbation by pneumonia. (01 Mar 2023 00:42)    Pro-BNP level was found to be elevated and TTE showed severely reduced LV function with EF 20-25%w and severe MR.  Patient was started on IV lasix 40mg bid.     VITALS  Vital Signs Last 24 Hrs  T(C): 36.6 (03 Mar 2023 07:45), Max: 36.7 (02 Mar 2023 23:50)  T(F): 97.8 (03 Mar 2023 07:45), Max: 98.1 (02 Mar 2023 23:50)  HR: 97 (03 Mar 2023 07:45) (97 - 107)  BP: 124/75 (03 Mar 2023 07:45) (119/78 - 142/88)  BP(mean): --  RR: 18 (03 Mar 2023 07:45) (18 - 18)  SpO2: 98% (03 Mar 2023 07:45) (94% - 98%)    Parameters below as of 02 Mar 2023 23:50  Patient On (Oxygen Delivery Method): room air        CAPILLARY BLOOD GLUCOSE          PHYSICAL EXAM  General: A&Ox3; NAD  Head: NC/AT; MMM; PERRL; EOMI;  Neck: Supple; no JVD  Respiratory: CTA B/L; no wheezes/crackles   Cardiovascular: Regular rhythm/rate; S1/S2   Gastrointestinal: Soft; NTND; normoactive BS  Extremities: WWP; no edema/cyanosis  Neurological:  CNII-XII grossly intact; no obvious focal deficits    MEDICATIONS  (STANDING):  albuterol    90 MICROgram(s) HFA Inhaler 2 Puff(s) Inhalation two times a day  albuterol/ipratropium for Nebulization 3 milliLiter(s) Nebulizer every 6 hours  budesonide 160 MICROgram(s)/formoterol 4.5 MICROgram(s) Inhaler 2 Puff(s) Inhalation two times a day  enoxaparin Injectable 40 milliGRAM(s) SubCutaneous every 24 hours  ferrous    sulfate 325 milliGRAM(s) Oral daily  furosemide   Injectable 40 milliGRAM(s) IV Push two times a day  influenza  Vaccine (HIGH DOSE) 0.7 milliLiter(s) IntraMuscular once  montelukast 10 milliGRAM(s) Oral daily  predniSONE   Tablet 40 milliGRAM(s) Oral daily    MEDICATIONS  (PRN):      [This allergen will not trigger allergy alert] environmental allegies (Unknown)  No Known Drug Allergies      LABS                    IMAGING/EKG/ETC  EKG:  Xray:  CT:  MRI: TRANSFER NOTE  From   To [facility] Course:    HPI:  67-year-old female past medical history of asthma with multiple hospitalizations without intubation, last was 15 years ago, presents with SOB. History goes back to 3 weeks ago where patient had SOB with cough, chest tightness and had to use her Symbicort and albuterol rescue inhaler more often. Patient use her Symbicort inhaler twice daily at baseline. Over the last 3 days her shortness of breath acutely worsened where she is using her albuterol inhaler every 15 to 20 minutes with no relief. Her oxygen saturation is 95% at baseline, but it decreased to 92%. Patient was picked up by EMS and given DuoNebs that made her feel better.  No nausea, vomiting, fever, chest pain, back pain, abdominal pain.  No sputum production.    In ED VS insignifcant except for , oxygen saturation 95% RA  Labs significant for VBG pH 7.31, pCO2 63, RVP negative  Imaging wet read of CXR showing Bilateral interstitial edema  In ED, treated with azithromycin, ceftriaxone, duoneb, solumedrol 125 mg, zofran.  Leonor was admitted for suspected asthma exacerbation by pneumonia. (01 Mar 2023 00:42)    Pro-BNP level was found to be elevated and TTE showed severely reduced LV function with EF 20-25%w and severe MR.  Patient was started on IV lasix 40mg bid. Cardiology recommended work for heart failure and MR etiology. She will need to get a LHC and MARISOL..      VITALS  Vital Signs Last 24 Hrs  T(C): 36.6 (03 Mar 2023 07:45), Max: 36.7 (02 Mar 2023 23:50)  T(F): 97.8 (03 Mar 2023 07:45), Max: 98.1 (02 Mar 2023 23:50)  HR: 97 (03 Mar 2023 07:45) (97 - 107)  BP: 124/75 (03 Mar 2023 07:45) (119/78 - 142/88)  BP(mean): --  RR: 18 (03 Mar 2023 07:45) (18 - 18)  SpO2: 98% (03 Mar 2023 07:45) (94% - 98%)    Parameters below as of 02 Mar 2023 23:50  Patient On (Oxygen Delivery Method): room air        CAPILLARY BLOOD GLUCOSE          PHYSICAL EXAM  General: A&Ox3; NAD  Head: NC/AT; MMM; PERRL; EOMI;  Neck: Supple; no JVD  Respiratory: CTA B/L; no wheezes/crackles   Cardiovascular: Regular rhythm/rate; S1/S2   Gastrointestinal: Soft; NTND; normoactive BS  Extremities: WWP; no edema/cyanosis  Neurological:  CNII-XII grossly intact; no obvious focal deficits    MEDICATIONS  (STANDING):  albuterol    90 MICROgram(s) HFA Inhaler 2 Puff(s) Inhalation two times a day  albuterol/ipratropium for Nebulization 3 milliLiter(s) Nebulizer every 6 hours  budesonide 160 MICROgram(s)/formoterol 4.5 MICROgram(s) Inhaler 2 Puff(s) Inhalation two times a day  enoxaparin Injectable 40 milliGRAM(s) SubCutaneous every 24 hours  ferrous    sulfate 325 milliGRAM(s) Oral daily  furosemide   Injectable 40 milliGRAM(s) IV Push two times a day  influenza  Vaccine (HIGH DOSE) 0.7 milliLiter(s) IntraMuscular once  montelukast 10 milliGRAM(s) Oral daily  predniSONE   Tablet 40 milliGRAM(s) Oral daily    MEDICATIONS  (PRN):      [This allergen will not trigger allergy alert] environmental allegies (Unknown)  No Known Drug Allergies    67-year-old female past medical history of asthma with multiple hospitalizations without intubation, last was 15 years ago, iron deficiency anemia presents with SOB found to have HFrEF and severe MR.    #SOB likely due CHF exacerbation  # Newly diagnosed HFrEF  - CXR b/l increase interstitial markings  - VBG pCO2 elevated  - no wheezing on my exam  - Albuterol, symbicort, duonebs  - prednisone 40 mg 5 days  - pro-calcitonin 0.04 (negative)  - PRo BNP  elevated  - lasix 40mg IV bid and switch to oral tomorrow  - D/c abx   - d/c prednisone  - TTE LVEF 20-25%, Severe MR  - accurate in/output  - Cadriology consult for work up of CHF     #Iron deficiency anemia  - ferrous sulfate daily    #Diet- regular  #DVT prophylaxis- lovenox 40 mg daily  Pending- wean off oxygen, resolution of chest tightness and SOB

## 2023-03-04 LAB
ALBUMIN SERPL ELPH-MCNC: 3.8 G/DL — SIGNIFICANT CHANGE UP (ref 3.5–5.2)
ALP SERPL-CCNC: 82 U/L — SIGNIFICANT CHANGE UP (ref 30–115)
ALT FLD-CCNC: 43 U/L — HIGH (ref 0–41)
ANION GAP SERPL CALC-SCNC: 11 MMOL/L — SIGNIFICANT CHANGE UP (ref 7–14)
AST SERPL-CCNC: 26 U/L — SIGNIFICANT CHANGE UP (ref 0–41)
BILIRUB SERPL-MCNC: 0.4 MG/DL — SIGNIFICANT CHANGE UP (ref 0.2–1.2)
BUN SERPL-MCNC: 23 MG/DL — HIGH (ref 10–20)
CALCIUM SERPL-MCNC: 8.3 MG/DL — LOW (ref 8.4–10.5)
CHLORIDE SERPL-SCNC: 102 MMOL/L — SIGNIFICANT CHANGE UP (ref 98–110)
CO2 SERPL-SCNC: 29 MMOL/L — SIGNIFICANT CHANGE UP (ref 17–32)
CREAT SERPL-MCNC: 0.9 MG/DL — SIGNIFICANT CHANGE UP (ref 0.7–1.5)
EGFR: 71 ML/MIN/1.73M2 — SIGNIFICANT CHANGE UP
GLUCOSE SERPL-MCNC: 101 MG/DL — HIGH (ref 70–99)
HCT VFR BLD CALC: 35.9 % — LOW (ref 37–47)
HGB BLD-MCNC: 11.1 G/DL — LOW (ref 12–16)
MAGNESIUM SERPL-MCNC: 2.2 MG/DL — SIGNIFICANT CHANGE UP (ref 1.8–2.4)
MCHC RBC-ENTMCNC: 27.1 PG — SIGNIFICANT CHANGE UP (ref 27–31)
MCHC RBC-ENTMCNC: 30.9 G/DL — LOW (ref 32–37)
MCV RBC AUTO: 87.8 FL — SIGNIFICANT CHANGE UP (ref 81–99)
NRBC # BLD: 0 /100 WBCS — SIGNIFICANT CHANGE UP (ref 0–0)
PLATELET # BLD AUTO: 256 K/UL — SIGNIFICANT CHANGE UP (ref 130–400)
POTASSIUM SERPL-MCNC: 3.5 MMOL/L — SIGNIFICANT CHANGE UP (ref 3.5–5)
POTASSIUM SERPL-SCNC: 3.5 MMOL/L — SIGNIFICANT CHANGE UP (ref 3.5–5)
PROT SERPL-MCNC: 6 G/DL — SIGNIFICANT CHANGE UP (ref 6–8)
RBC # BLD: 4.09 M/UL — LOW (ref 4.2–5.4)
RBC # FLD: 13.6 % — SIGNIFICANT CHANGE UP (ref 11.5–14.5)
SARS-COV-2 RNA SPEC QL NAA+PROBE: SIGNIFICANT CHANGE UP
SODIUM SERPL-SCNC: 142 MMOL/L — SIGNIFICANT CHANGE UP (ref 135–146)
WBC # BLD: 6.19 K/UL — SIGNIFICANT CHANGE UP (ref 4.8–10.8)
WBC # FLD AUTO: 6.19 K/UL — SIGNIFICANT CHANGE UP (ref 4.8–10.8)

## 2023-03-04 PROCEDURE — 99233 SBSQ HOSP IP/OBS HIGH 50: CPT

## 2023-03-04 RX ORDER — SACUBITRIL AND VALSARTAN 24; 26 MG/1; MG/1
1 TABLET, FILM COATED ORAL
Refills: 0 | Status: DISCONTINUED | OUTPATIENT
Start: 2023-03-04 | End: 2023-03-08

## 2023-03-04 RX ORDER — FUROSEMIDE 40 MG
40 TABLET ORAL
Refills: 0 | Status: DISCONTINUED | OUTPATIENT
Start: 2023-03-04 | End: 2023-03-07

## 2023-03-04 RX ORDER — METOPROLOL TARTRATE 50 MG
12.5 TABLET ORAL
Refills: 0 | Status: DISCONTINUED | OUTPATIENT
Start: 2023-03-04 | End: 2023-03-05

## 2023-03-04 RX ADMIN — MONTELUKAST 10 MILLIGRAM(S): 4 TABLET, CHEWABLE ORAL at 13:10

## 2023-03-04 RX ADMIN — Medication 3 MILLILITER(S): at 20:50

## 2023-03-04 RX ADMIN — Medication 12.5 MILLIGRAM(S): at 17:18

## 2023-03-04 RX ADMIN — Medication 3 MILLILITER(S): at 07:48

## 2023-03-04 RX ADMIN — ENOXAPARIN SODIUM 40 MILLIGRAM(S): 100 INJECTION SUBCUTANEOUS at 05:34

## 2023-03-04 RX ADMIN — SACUBITRIL AND VALSARTAN 1 TABLET(S): 24; 26 TABLET, FILM COATED ORAL at 17:17

## 2023-03-04 RX ADMIN — Medication 325 MILLIGRAM(S): at 13:10

## 2023-03-04 RX ADMIN — Medication 40 MILLIGRAM(S): at 05:34

## 2023-03-04 RX ADMIN — Medication 3 MILLILITER(S): at 14:05

## 2023-03-04 RX ADMIN — SACUBITRIL AND VALSARTAN 1 TABLET(S): 24; 26 TABLET, FILM COATED ORAL at 13:09

## 2023-03-04 RX ADMIN — Medication 40 MILLIGRAM(S): at 13:09

## 2023-03-04 RX ADMIN — BUDESONIDE AND FORMOTEROL FUMARATE DIHYDRATE 2 PUFF(S): 160; 4.5 AEROSOL RESPIRATORY (INHALATION) at 22:21

## 2023-03-04 NOTE — PROGRESS NOTE ADULT - ASSESSMENT
67-year-old female past medical history of asthma with multiple hospitalizations without intubation, last was 15 years ago, iron deficiency anemia presents with SOB. History goes back to 3 weeks ago where patient had SOB with cough and had to use her Symbicort and albuterol rescue inhaler more often. Patient use her Symbicort inhaler twice daily at baseline. Over the last 3 days her shortness of breath acutely worsened where she is using her albuterol inhaler every 15 to 20 minutes with no relief. Her oxygen saturation is 95% at baseline, but it decreased to 92%. Patient was picked up by EMS and given DuoNebs that made her feel better.  No nausea, vomiting, fever, chest pain, back pain, abdominal pain. No sputum production.    A/p:   Acute HFrEF:   New diagnosis, patient presented with SOB and Cough, leg edema. Pro-BNP  CXR showed bilateral interstitial infiltrates.   Echo LVEF 20-25%, severe MR< mild TR, mild AR.   s/p Lasix 40mg IV, switched to Lasix 40mg Po daily.   Start on Metoprolol 12.5mg BID and Entresto   Cardiology plan for cardiac cath. Patient reports Family history of Amyloidosis. ??     Wheezing, history of asthma Improved,   Likely cardiac wheezing, lungs are clear.   Continue Symbicort. Pulmonary follow up     Iron deficiency anemia  - ferrous sulfate daily    #DVT prophylaxis- lovenox 40 mg daily  Pending cardiac cath.

## 2023-03-04 NOTE — PROGRESS NOTE ADULT - SUBJECTIVE AND OBJECTIVE BOX
PASCUAL PANTOJA  67y  Female      Patient is a 67y old  Female who presents with a chief complaint of asthma exacerbation, pneumonia (03 Mar 2023 13:00)      INTERVAL HPI/OVERNIGHT EVENTS:  She feels ok, SOB improved.   Vital Signs Last 24 Hrs  T(C): 36.1 (04 Mar 2023 13:07), Max: 36.1 (04 Mar 2023 05:19)  T(F): 97 (04 Mar 2023 13:07), Max: 97 (04 Mar 2023 05:19)  HR: 89 (04 Mar 2023 13:07) (88 - 89)  BP: 110/58 (04 Mar 2023 13:07) (110/58 - 115/74)  BP(mean): --  RR: 18 (04 Mar 2023 13:07) (18 - 18)  SpO2: --                Consultant(s) Notes Reviewed:  [x ] YES  [ ] NO          MEDICATIONS  (STANDING):  albuterol    90 MICROgram(s) HFA Inhaler 2 Puff(s) Inhalation two times a day  albuterol/ipratropium for Nebulization 3 milliLiter(s) Nebulizer every 6 hours  budesonide 160 MICROgram(s)/formoterol 4.5 MICROgram(s) Inhaler 2 Puff(s) Inhalation two times a day  enoxaparin Injectable 40 milliGRAM(s) SubCutaneous every 24 hours  ferrous    sulfate 325 milliGRAM(s) Oral daily  furosemide    Tablet 40 milliGRAM(s) Oral two times a day  influenza  Vaccine (HIGH DOSE) 0.7 milliLiter(s) IntraMuscular once  metoprolol tartrate 12.5 milliGRAM(s) Oral two times a day  montelukast 10 milliGRAM(s) Oral daily  sacubitril 24 mG/valsartan 26 mG 1 Tablet(s) Oral two times a day    MEDICATIONS  (PRN):      LABS                          11.1   6.19  )-----------( 256      ( 04 Mar 2023 01:34 )             35.9     03-04    142  |  102  |  23<H>  ----------------------------<  101<H>  3.5   |  29  |  0.9    Ca    8.3<L>      04 Mar 2023 01:34  Mg     2.2     03-04    TPro  6.0  /  Alb  3.8  /  TBili  0.4  /  DBili  x   /  AST  26  /  ALT  43<H>  /  AlkPhos  82  03-04          Lactate Trend        CAPILLARY BLOOD GLUCOSE          Culture - Legionella (collected 03-01-23 @ 13:15)  Source: .Legionella None  Preliminary Report (03-04-23 @ 00:24):    No Legionella species isolated        RADIOLOGY & ADDITIONAL TESTS:    Imaging Personally Reviewed:  [ ] YES  [ ] NO    HEALTH ISSUES - PROBLEM Dx:          PHYSICAL EXAM:  GENERAL: NAD, well-developed.  HEAD:  Atraumatic, Normocephalic.  EYES: EOMI, PERRLA, conjunctiva and sclera clear.  NECK: Supple, No JVD.  CHEST/LUNG: Clear to auscultation bilaterally; No wheeze.  HEART: Regular rate and rhythm; S1 S2.   ABDOMEN: Soft, Nontender, Nondistended; Bowel sounds present.  EXTREMITIES:  2+ Peripheral Pulses, No clubbing, cyanosis, or edema.  PSYCH: AAOx3.  NEUROLOGY: non-focal.  SKIN: No rashes or lesions.

## 2023-03-05 LAB
ALBUMIN SERPL ELPH-MCNC: 3.8 G/DL — SIGNIFICANT CHANGE UP (ref 3.5–5.2)
ALP SERPL-CCNC: 84 U/L — SIGNIFICANT CHANGE UP (ref 30–115)
ALT FLD-CCNC: 40 U/L — SIGNIFICANT CHANGE UP (ref 0–41)
ANION GAP SERPL CALC-SCNC: 10 MMOL/L — SIGNIFICANT CHANGE UP (ref 7–14)
AST SERPL-CCNC: 17 U/L — SIGNIFICANT CHANGE UP (ref 0–41)
BASOPHILS # BLD AUTO: 0.04 K/UL — SIGNIFICANT CHANGE UP (ref 0–0.2)
BASOPHILS NFR BLD AUTO: 0.7 % — SIGNIFICANT CHANGE UP (ref 0–1)
BILIRUB SERPL-MCNC: 0.3 MG/DL — SIGNIFICANT CHANGE UP (ref 0.2–1.2)
BUN SERPL-MCNC: 21 MG/DL — HIGH (ref 10–20)
CALCIUM SERPL-MCNC: 8.6 MG/DL — SIGNIFICANT CHANGE UP (ref 8.4–10.4)
CHLORIDE SERPL-SCNC: 102 MMOL/L — SIGNIFICANT CHANGE UP (ref 98–110)
CO2 SERPL-SCNC: 30 MMOL/L — SIGNIFICANT CHANGE UP (ref 17–32)
CREAT SERPL-MCNC: 0.9 MG/DL — SIGNIFICANT CHANGE UP (ref 0.7–1.5)
EGFR: 71 ML/MIN/1.73M2 — SIGNIFICANT CHANGE UP
EOSINOPHIL # BLD AUTO: 0.26 K/UL — SIGNIFICANT CHANGE UP (ref 0–0.7)
EOSINOPHIL NFR BLD AUTO: 4.6 % — SIGNIFICANT CHANGE UP (ref 0–8)
GLUCOSE SERPL-MCNC: 95 MG/DL — SIGNIFICANT CHANGE UP (ref 70–99)
HCT VFR BLD CALC: 40.2 % — SIGNIFICANT CHANGE UP (ref 37–47)
HGB BLD-MCNC: 12.6 G/DL — SIGNIFICANT CHANGE UP (ref 12–16)
IMM GRANULOCYTES NFR BLD AUTO: 0.4 % — HIGH (ref 0.1–0.3)
LYMPHOCYTES # BLD AUTO: 2.66 K/UL — SIGNIFICANT CHANGE UP (ref 1.2–3.4)
LYMPHOCYTES # BLD AUTO: 46.9 % — SIGNIFICANT CHANGE UP (ref 20.5–51.1)
MAGNESIUM SERPL-MCNC: 2.3 MG/DL — SIGNIFICANT CHANGE UP (ref 1.8–2.4)
MCHC RBC-ENTMCNC: 27.7 PG — SIGNIFICANT CHANGE UP (ref 27–31)
MCHC RBC-ENTMCNC: 31.3 G/DL — LOW (ref 32–37)
MCV RBC AUTO: 88.4 FL — SIGNIFICANT CHANGE UP (ref 81–99)
MONOCYTES # BLD AUTO: 0.54 K/UL — SIGNIFICANT CHANGE UP (ref 0.1–0.6)
MONOCYTES NFR BLD AUTO: 9.5 % — HIGH (ref 1.7–9.3)
NEUTROPHILS # BLD AUTO: 2.15 K/UL — SIGNIFICANT CHANGE UP (ref 1.4–6.5)
NEUTROPHILS NFR BLD AUTO: 37.9 % — LOW (ref 42.2–75.2)
NRBC # BLD: 0 /100 WBCS — SIGNIFICANT CHANGE UP (ref 0–0)
PLATELET # BLD AUTO: 272 K/UL — SIGNIFICANT CHANGE UP (ref 130–400)
POTASSIUM SERPL-MCNC: 3.9 MMOL/L — SIGNIFICANT CHANGE UP (ref 3.5–5)
POTASSIUM SERPL-SCNC: 3.9 MMOL/L — SIGNIFICANT CHANGE UP (ref 3.5–5)
PROT SERPL-MCNC: 5.8 G/DL — LOW (ref 6–8)
RBC # BLD: 4.55 M/UL — SIGNIFICANT CHANGE UP (ref 4.2–5.4)
RBC # FLD: 13.7 % — SIGNIFICANT CHANGE UP (ref 11.5–14.5)
SODIUM SERPL-SCNC: 142 MMOL/L — SIGNIFICANT CHANGE UP (ref 135–146)
WBC # BLD: 5.67 K/UL — SIGNIFICANT CHANGE UP (ref 4.8–10.8)
WBC # FLD AUTO: 5.67 K/UL — SIGNIFICANT CHANGE UP (ref 4.8–10.8)

## 2023-03-05 PROCEDURE — 99233 SBSQ HOSP IP/OBS HIGH 50: CPT

## 2023-03-05 RX ORDER — METOPROLOL TARTRATE 50 MG
25 TABLET ORAL
Refills: 0 | Status: DISCONTINUED | OUTPATIENT
Start: 2023-03-05 | End: 2023-03-07

## 2023-03-05 RX ADMIN — Medication 40 MILLIGRAM(S): at 13:17

## 2023-03-05 RX ADMIN — Medication 3 MILLILITER(S): at 08:40

## 2023-03-05 RX ADMIN — Medication 40 MILLIGRAM(S): at 06:17

## 2023-03-05 RX ADMIN — ENOXAPARIN SODIUM 40 MILLIGRAM(S): 100 INJECTION SUBCUTANEOUS at 06:17

## 2023-03-05 RX ADMIN — SACUBITRIL AND VALSARTAN 1 TABLET(S): 24; 26 TABLET, FILM COATED ORAL at 06:17

## 2023-03-05 RX ADMIN — Medication 12.5 MILLIGRAM(S): at 06:17

## 2023-03-05 RX ADMIN — Medication 325 MILLIGRAM(S): at 12:02

## 2023-03-05 RX ADMIN — Medication 3 MILLILITER(S): at 19:34

## 2023-03-05 RX ADMIN — Medication 3 MILLILITER(S): at 14:34

## 2023-03-05 RX ADMIN — MONTELUKAST 10 MILLIGRAM(S): 4 TABLET, CHEWABLE ORAL at 12:02

## 2023-03-05 RX ADMIN — Medication 25 MILLIGRAM(S): at 17:15

## 2023-03-05 NOTE — PROGRESS NOTE ADULT - ASSESSMENT
67-year-old female past medical history of asthma with multiple hospitalizations without intubation, last was 15 years ago, iron deficiency anemia presents with SOB found to have HFrEF and severe MR.    #SOB likely due CHF exacerbation  # Newly diagnosed HFrEF  #h/o asthma   - CXR b/l increase interstitial markings  - VBG pCO2 elevated  - Albuterol, symbicort, duonebs  - prednisone 40 mg 5 days  - pro-calcitonin 0.04 (negative)  - PRo BNP  elevated  - lasix 40mg IV bid and switched to oral  - D/c abx   - d/c prednisone  - TTE LVEF 20-25%, Severe MR  - accurate in/output  - Cadriology consult for work up of CHF   -pulm follow up  -continue with Symbicort       #Iron deficiency anemia  - ferrous sulfate daily    #Diet- regular  #DVT prophylaxis- lovenox 40 mg daily  Pending- cardiac cath

## 2023-03-05 NOTE — PROGRESS NOTE ADULT - SUBJECTIVE AND OBJECTIVE BOX
PASCUAL PANTOJA  67y  Female      Patient is a 67y old  Female who presents with a chief complaint of asthma exacerbation, pneumonia (05 Mar 2023 01:02)      INTERVAL HPI/OVERNIGHT EVENTS:  She feels ok, no chest pain, episode of NSVT yesterday.   Vital Signs Last 24 Hrs  T(C): 36.7 (05 Mar 2023 04:57), Max: 36.7 (05 Mar 2023 04:57)  T(F): 98 (05 Mar 2023 04:57), Max: 98 (05 Mar 2023 04:57)  HR: 88 (05 Mar 2023 12:54) (84 - 90)  BP: 101/61 (05 Mar 2023 12:54) (101/61 - 142/82)  BP(mean): --  RR: 18 (04 Mar 2023 20:15) (18 - 18)  SpO2: 96% (04 Mar 2023 20:15) (96% - 96%)    Parameters below as of 04 Mar 2023 20:15  Patient On (Oxygen Delivery Method): room air          03-05-23 @ 07:01  -  03-05-23 @ 14:46  --------------------------------------------------------  IN: 440 mL / OUT: 0 mL / NET: 440 mL            Consultant(s) Notes Reviewed:  [x ] YES  [ ] NO          MEDICATIONS  (STANDING):  albuterol    90 MICROgram(s) HFA Inhaler 2 Puff(s) Inhalation two times a day  albuterol/ipratropium for Nebulization 3 milliLiter(s) Nebulizer every 6 hours  budesonide 160 MICROgram(s)/formoterol 4.5 MICROgram(s) Inhaler 2 Puff(s) Inhalation two times a day  enoxaparin Injectable 40 milliGRAM(s) SubCutaneous every 24 hours  ferrous    sulfate 325 milliGRAM(s) Oral daily  furosemide    Tablet 40 milliGRAM(s) Oral two times a day  influenza  Vaccine (HIGH DOSE) 0.7 milliLiter(s) IntraMuscular once  metoprolol tartrate 25 milliGRAM(s) Oral two times a day  montelukast 10 milliGRAM(s) Oral daily  sacubitril 24 mG/valsartan 26 mG 1 Tablet(s) Oral two times a day    MEDICATIONS  (PRN):      LABS                          12.6   5.67  )-----------( 272      ( 05 Mar 2023 06:15 )             40.2     03-05    142  |  102  |  21<H>  ----------------------------<  95  3.9   |  30  |  0.9    Ca    8.6      05 Mar 2023 06:15  Mg     2.3     03-05    TPro  5.8<L>  /  Alb  3.8  /  TBili  0.3  /  DBili  x   /  AST  17  /  ALT  40  /  AlkPhos  84  03-05          Lactate Trend        CAPILLARY BLOOD GLUCOSE          Culture - Legionella (collected 03-01-23 @ 13:15)  Source: .Legionella None  Preliminary Report (03-04-23 @ 00:24):    No Legionella species isolated        RADIOLOGY & ADDITIONAL TESTS:    Imaging Personally Reviewed:  [ ] YES  [ ] NO    HEALTH ISSUES - PROBLEM Dx:          PHYSICAL EXAM:  GENERAL: NAD, well-developed.  HEAD:  Atraumatic, Normocephalic.  EYES: EOMI, PERRLA, conjunctiva and sclera clear.  NECK: Supple, No JVD.  CHEST/LUNG: Clear to auscultation bilaterally; No wheeze.  HEART: Regular rate and rhythm; S1 S2.   ABDOMEN: Soft, Nontender, Nondistended; Bowel sounds present.  EXTREMITIES:  2+ Peripheral Pulses, No clubbing, cyanosis, or edema.  PSYCH: AAOx3.  NEUROLOGY: non-focal.  SKIN: No rashes or lesions.

## 2023-03-05 NOTE — PROGRESS NOTE ADULT - SUBJECTIVE AND OBJECTIVE BOX
SUBJECTIVE:    Patient is a 67y old Female who presents with a chief complaint of asthma exacerbation, pneumonia (05 Mar 2023 01:02)    Currently admitted to medicine with the primary diagnosis of:    Today is hospital day 5d.     Overnight Events:     recieving neb    PAST MEDICAL & SURGICAL HISTORY  Asthma    HTN (hypertension)    H/O total knee replacement      ALLERGIES:  [This allergen will not trigger allergy alert] environmental allegies (Unknown)  No Known Drug Allergies    MEDICATIONS:  STANDING MEDICATIONS  albuterol    90 MICROgram(s) HFA Inhaler 2 Puff(s) Inhalation two times a day  albuterol/ipratropium for Nebulization 3 milliLiter(s) Nebulizer every 6 hours  budesonide 160 MICROgram(s)/formoterol 4.5 MICROgram(s) Inhaler 2 Puff(s) Inhalation two times a day  enoxaparin Injectable 40 milliGRAM(s) SubCutaneous every 24 hours  ferrous    sulfate 325 milliGRAM(s) Oral daily  furosemide    Tablet 40 milliGRAM(s) Oral two times a day  influenza  Vaccine (HIGH DOSE) 0.7 milliLiter(s) IntraMuscular once  metoprolol tartrate 12.5 milliGRAM(s) Oral two times a day  montelukast 10 milliGRAM(s) Oral daily  sacubitril 24 mG/valsartan 26 mG 1 Tablet(s) Oral two times a day    PRN MEDICATIONS    VITALS:   ICU Vital Signs Last 24 Hrs  T(C): 36.2 (04 Mar 2023 20:15), Max: 36.2 (04 Mar 2023 20:15)  T(F): 97.1 (04 Mar 2023 20:15), Max: 97.1 (04 Mar 2023 20:15)  HR: 90 (04 Mar 2023 20:15) (88 - 90)  BP: 114/67 (04 Mar 2023 20:15) (110/58 - 115/74)  BP(mean): --  ABP: --  ABP(mean): --  RR: 18 (04 Mar 2023 20:15) (18 - 18)  SpO2: 96% (04 Mar 2023 20:15) (96% - 96%)    O2 Parameters below as of 04 Mar 2023 20:15  Patient On (Oxygen Delivery Method): room air            LABS:                        11.1   6.19  )-----------( 256      ( 04 Mar 2023 01:34 )             35.9     03-04    142  |  102  |  23<H>  ----------------------------<  101<H>  3.5   |  29  |  0.9    Ca    8.3<L>      04 Mar 2023 01:34  Mg     2.2     03-04    TPro  6.0  /  Alb  3.8  /  TBili  0.4  /  DBili  x   /  AST  26  /  ALT  43<H>  /  AlkPhos  82  03-04                    RADIOLOGY:  < from: Xray Chest 1 View- PORTABLE-Routine (Xray Chest 1 View- PORTABLE-Routine in AM.) (03.03.23 @ 06:58) >  Unchanged pulmonary vascular congestion.    < end of copied text >    PHYSICAL EXAM:  GEN: laying in bed, recieving neb  LUNGS: clear  HEART: s1 s2  ABD: nontender  EXT: no lower extremity edema  NEURO: awake, alert, friendly

## 2023-03-05 NOTE — PROGRESS NOTE ADULT - ASSESSMENT
67-year-old female past medical history of asthma with multiple hospitalizations without intubation, last was 15 years ago, iron deficiency anemia presents with SOB. History goes back to 3 weeks ago where patient had SOB with cough and had to use her Symbicort and albuterol rescue inhaler more often. Patient use her Symbicort inhaler twice daily at baseline. Over the last 3 days her shortness of breath acutely worsened where she is using her albuterol inhaler every 15 to 20 minutes with no relief. Her oxygen saturation is 95% at baseline, but it decreased to 92%. Patient was picked up by EMS and given DuoNebs that made her feel better.  No nausea, vomiting, fever, chest pain, back pain, abdominal pain. No sputum production.    A/p:   Acute HFrEF:   New diagnosis, patient presented with SOB and Cough, leg edema. Pro-BNP  CXR showed bilateral interstitial infiltrates.   Echo LVEF 20-25%, severe MR< mild TR, mild AR.   s/p Lasix 40mg IV, switched to Lasix 40mg Po daily.   Start on Metoprolol 12.5mg BID and Entresto   Cardiology plan for cardiac cath. Patient reports Family history of Amyloidosis. ??     Ventricular Tachycardia: multiple episodes of non sustained V-tach  Continue Metoprolol, will increase the dose.     Wheezing, history of asthma Improved,   Likely cardiac wheezing, lungs are clear.   Continue Symbicort. Pulmonary follow up     Iron deficiency anemia  Continue ferrous sulfate daily    #DVT prophylaxis- lovenox 40 mg daily  Pending cardiac cath tomorrow.

## 2023-03-06 ENCOUNTER — TRANSCRIPTION ENCOUNTER (OUTPATIENT)
Age: 68
End: 2023-03-06

## 2023-03-06 LAB
ALBUMIN SERPL ELPH-MCNC: 3.5 G/DL — SIGNIFICANT CHANGE UP (ref 3.5–5.2)
ALP SERPL-CCNC: 83 U/L — SIGNIFICANT CHANGE UP (ref 30–115)
ALT FLD-CCNC: 43 U/L — HIGH (ref 0–41)
ANION GAP SERPL CALC-SCNC: 10 MMOL/L — SIGNIFICANT CHANGE UP (ref 7–14)
AST SERPL-CCNC: 26 U/L — SIGNIFICANT CHANGE UP (ref 0–41)
BASOPHILS # BLD AUTO: 0.04 K/UL — SIGNIFICANT CHANGE UP (ref 0–0.2)
BASOPHILS NFR BLD AUTO: 0.7 % — SIGNIFICANT CHANGE UP (ref 0–1)
BILIRUB SERPL-MCNC: 0.3 MG/DL — SIGNIFICANT CHANGE UP (ref 0.2–1.2)
BUN SERPL-MCNC: 21 MG/DL — HIGH (ref 10–20)
CALCIUM SERPL-MCNC: 8.5 MG/DL — SIGNIFICANT CHANGE UP (ref 8.4–10.4)
CHLORIDE SERPL-SCNC: 104 MMOL/L — SIGNIFICANT CHANGE UP (ref 98–110)
CO2 SERPL-SCNC: 28 MMOL/L — SIGNIFICANT CHANGE UP (ref 17–32)
CREAT SERPL-MCNC: 0.9 MG/DL — SIGNIFICANT CHANGE UP (ref 0.7–1.5)
EGFR: 71 ML/MIN/1.73M2 — SIGNIFICANT CHANGE UP
EOSINOPHIL # BLD AUTO: 0.28 K/UL — SIGNIFICANT CHANGE UP (ref 0–0.7)
EOSINOPHIL NFR BLD AUTO: 4.8 % — SIGNIFICANT CHANGE UP (ref 0–8)
GLUCOSE SERPL-MCNC: 101 MG/DL — HIGH (ref 70–99)
HCT VFR BLD CALC: 40.1 % — SIGNIFICANT CHANGE UP (ref 37–47)
HGB BLD-MCNC: 12.5 G/DL — SIGNIFICANT CHANGE UP (ref 12–16)
IMM GRANULOCYTES NFR BLD AUTO: 0.7 % — HIGH (ref 0.1–0.3)
LYMPHOCYTES # BLD AUTO: 2.19 K/UL — SIGNIFICANT CHANGE UP (ref 1.2–3.4)
LYMPHOCYTES # BLD AUTO: 37.9 % — SIGNIFICANT CHANGE UP (ref 20.5–51.1)
MAGNESIUM SERPL-MCNC: 2.3 MG/DL — SIGNIFICANT CHANGE UP (ref 1.8–2.4)
MCHC RBC-ENTMCNC: 27.9 PG — SIGNIFICANT CHANGE UP (ref 27–31)
MCHC RBC-ENTMCNC: 31.2 G/DL — LOW (ref 32–37)
MCV RBC AUTO: 89.5 FL — SIGNIFICANT CHANGE UP (ref 81–99)
MONOCYTES # BLD AUTO: 0.57 K/UL — SIGNIFICANT CHANGE UP (ref 0.1–0.6)
MONOCYTES NFR BLD AUTO: 9.9 % — HIGH (ref 1.7–9.3)
NEUTROPHILS # BLD AUTO: 2.66 K/UL — SIGNIFICANT CHANGE UP (ref 1.4–6.5)
NEUTROPHILS NFR BLD AUTO: 46 % — SIGNIFICANT CHANGE UP (ref 42.2–75.2)
NRBC # BLD: 0 /100 WBCS — SIGNIFICANT CHANGE UP (ref 0–0)
PLATELET # BLD AUTO: 304 K/UL — SIGNIFICANT CHANGE UP (ref 130–400)
POTASSIUM SERPL-MCNC: 4.2 MMOL/L — SIGNIFICANT CHANGE UP (ref 3.5–5)
POTASSIUM SERPL-SCNC: 4.2 MMOL/L — SIGNIFICANT CHANGE UP (ref 3.5–5)
PROT SERPL-MCNC: 5.7 G/DL — LOW (ref 6–8)
RBC # BLD: 4.48 M/UL — SIGNIFICANT CHANGE UP (ref 4.2–5.4)
RBC # FLD: 13.6 % — SIGNIFICANT CHANGE UP (ref 11.5–14.5)
SODIUM SERPL-SCNC: 142 MMOL/L — SIGNIFICANT CHANGE UP (ref 135–146)
WBC # BLD: 5.78 K/UL — SIGNIFICANT CHANGE UP (ref 4.8–10.8)
WBC # FLD AUTO: 5.78 K/UL — SIGNIFICANT CHANGE UP (ref 4.8–10.8)

## 2023-03-06 PROCEDURE — 99232 SBSQ HOSP IP/OBS MODERATE 35: CPT

## 2023-03-06 PROCEDURE — 93458 L HRT ARTERY/VENTRICLE ANGIO: CPT | Mod: 26

## 2023-03-06 RX ORDER — SODIUM CHLORIDE 9 MG/ML
1000 INJECTION INTRAMUSCULAR; INTRAVENOUS; SUBCUTANEOUS
Refills: 0 | Status: DISCONTINUED | OUTPATIENT
Start: 2023-03-06 | End: 2023-03-07

## 2023-03-06 RX ORDER — FUROSEMIDE 40 MG
1 TABLET ORAL
Qty: 60 | Refills: 0
Start: 2023-03-06 | End: 2023-04-04

## 2023-03-06 RX ORDER — IPRATROPIUM/ALBUTEROL SULFATE 18-103MCG
3 AEROSOL WITH ADAPTER (GRAM) INHALATION
Qty: 0 | Refills: 0 | DISCHARGE
Start: 2023-03-06

## 2023-03-06 RX ORDER — FERROUS SULFATE 325(65) MG
1 TABLET ORAL
Qty: 30 | Refills: 0
Start: 2023-03-06 | End: 2023-04-04

## 2023-03-06 RX ORDER — METOPROLOL TARTRATE 50 MG
1 TABLET ORAL
Qty: 60 | Refills: 0
Start: 2023-03-06 | End: 2023-04-04

## 2023-03-06 RX ORDER — SACUBITRIL AND VALSARTAN 24; 26 MG/1; MG/1
1 TABLET, FILM COATED ORAL
Qty: 60 | Refills: 0
Start: 2023-03-06 | End: 2023-04-04

## 2023-03-06 RX ORDER — APIXABAN 2.5 MG/1
5 TABLET, FILM COATED ORAL EVERY 12 HOURS
Refills: 0 | Status: DISCONTINUED | OUTPATIENT
Start: 2023-03-07 | End: 2023-03-11

## 2023-03-06 RX ADMIN — Medication 40 MILLIGRAM(S): at 16:00

## 2023-03-06 RX ADMIN — Medication 3 MILLILITER(S): at 08:13

## 2023-03-06 RX ADMIN — Medication 325 MILLIGRAM(S): at 11:25

## 2023-03-06 RX ADMIN — BUDESONIDE AND FORMOTEROL FUMARATE DIHYDRATE 2 PUFF(S): 160; 4.5 AEROSOL RESPIRATORY (INHALATION) at 21:54

## 2023-03-06 RX ADMIN — Medication 25 MILLIGRAM(S): at 06:00

## 2023-03-06 RX ADMIN — SACUBITRIL AND VALSARTAN 1 TABLET(S): 24; 26 TABLET, FILM COATED ORAL at 06:00

## 2023-03-06 RX ADMIN — SACUBITRIL AND VALSARTAN 1 TABLET(S): 24; 26 TABLET, FILM COATED ORAL at 17:12

## 2023-03-06 RX ADMIN — Medication 25 MILLIGRAM(S): at 17:11

## 2023-03-06 RX ADMIN — SODIUM CHLORIDE 100 MILLILITER(S): 9 INJECTION INTRAMUSCULAR; INTRAVENOUS; SUBCUTANEOUS at 13:24

## 2023-03-06 RX ADMIN — MONTELUKAST 10 MILLIGRAM(S): 4 TABLET, CHEWABLE ORAL at 11:24

## 2023-03-06 RX ADMIN — Medication 40 MILLIGRAM(S): at 06:00

## 2023-03-06 NOTE — CHART NOTE - NSCHARTNOTEFT_GEN_A_CORE
PRE-OP DIAGNOSIS:  New onset HfrEF      PROCEDURE:     [X] Coronary Angiogram     [X] LHC     [] LVG     [] RHC     [] Intervention (see below)         PHYSICIAN:  Dr. Bernabe    ASSISTANT:  Dr. Tc Chaudhry       PROCEDURE DESCRIPTION:     Consent:      [X] Patient     [] Family Member     []  Used        Anesthesia:     [X] General     [] Sedation     [X] Local        Access & Closure:     [X] 6 Fr R Radial Artery     [] Fr Femoral Artery     [] Fr Femoral Vein     [] Fr Brachial Vein       IV Contrast: 25 mL        Intervention: None      Implants: None       FINDINGS:     Coronary Dominance: Right dominate      LM: No significant disease    LAD: No significant disease    CX: No significant disease    RCA: No significant disease     LVEDP: 12 mmHg        ESTIMATED BLOOD LOSS: < 10 mL        CONDITION:     [X] Good     [] Fair     [] Critical        SPECIMEN REMOVED: N/A       POST-OP DIAGNOSIS:      [X] Normal Coronary Angiogram, non-ischemic cardiomyopathy    [] Mild Coronary Artery Disease (< 50% stenosis)     [] __ Vessel Coronary Artery Disease        PLAN OF CARE:     [] D/C Home Today     [X] Return to In-patient bed     [] Admit for observation     [] Return for Staged Procedure     [] CT Surgery Consult     [] Medications: Cont GDMT, consider HF consult    [] IV Fluids: NS 100cc/hr x 4hrs

## 2023-03-06 NOTE — CHART NOTE - NSCHARTNOTEFT_GEN_A_CORE
PREOPERATIVE DAY OF PROCEDURE EVALUATION:  I have personally seen and examined the patient.  I agree with the history and physical which I have reviewed and noted any changes below.  (Signed electronically by __________)  03-06-23 @ 11:58      Cath Bleeding Risk: 1.5%    Prehydration: Patient has active CHF, ef 20-25%, pulmonary vascular congestion, pre cath fluids currently contraindicated at this time.     Right Zach test: positive

## 2023-03-06 NOTE — PROGRESS NOTE ADULT - ATTENDING COMMENTS
Pt seen and examined at bedside and A/P discussed in detail. I agree with resident note as reviewed above.     Problem List   Acute Hypoxic and Hypercarbic Respiratory Failure   Acute Asthma Exarcerbation   Suspecting Acute HF NOS (high BNP)  Suspecting RLL PNA     Iron Def Anemia  History of HTN   History of Obesity     Plan:   Check TTE   Solumedrol q8h   Duonebs q6h  Can stop Ceftriaxone if Pro-zaina is low but complete 5 total days for azithromycin oral.   Start Lasix 40mg IV q12h  I/O's and Low Na diet   c/w all other home meds     d/w Pulmonary and care appreciated   Outpt PFTs and Sleep Study     MEDICATIONS  (STANDING):  albuterol    90 MICROgram(s) HFA Inhaler 2 Puff(s) Inhalation two times a day  albuterol/ipratropium for Nebulization 3 milliLiter(s) Nebulizer every 6 hours  azithromycin   Tablet 500 milliGRAM(s) Oral daily  budesonide 160 MICROgram(s)/formoterol 4.5 MICROgram(s) Inhaler 2 Puff(s) Inhalation two times a day  enoxaparin Injectable 40 milliGRAM(s) SubCutaneous every 24 hours  ferrous    sulfate 325 milliGRAM(s) Oral daily  furosemide   Injectable 40 milliGRAM(s) IV Push two times a day  influenza  Vaccine (HIGH DOSE) 0.7 milliLiter(s) IntraMuscular once  montelukast 10 milliGRAM(s) Oral daily  predniSONE   Tablet 40 milliGRAM(s) Oral daily    GI/DVT Proph                           10.8   5.18  )-----------( 234      ( 01 Mar 2023 11:18 )             35.5     03-01    141  |  106  |  17  ----------------------------<  146<H>  4.5   |  26  |  0.8    Ca    8.9      01 Mar 2023 11:18  Mg     2.2     03-01    TPro  6.4  /  Alb  4.0  /  TBili  0.3  /  DBili  x   /  AST  29  /  ALT  28  /  AlkPhos  98  02-28      Handoff:   Pending: Diuresis / resolution of ZELAYA / IV Lasix IV Solumedrol / TTE  Family: Pt makes own lilli decisions   Dispo: Home / Acute
Pt seen and examined at bedside and A/P discussed in detail. I agree with resident note as reviewed above.     Problem List   Acute Hypoxic and Hypercarbic Respiratory Failure   Acute Asthma Exarcerbation   History of Severe Persistent Asthma   Confirmed Acute and NEW HFrEF  Severe Mitral Regurgitation   PNA Ruled out and abx d/c'd     Iron Def Anemia  History of HTN   History of Obesity     Plan:   Upgrade to telemetry unit   MARISOL/LHC Monday   Oral Prednisone 40mg po qd 5d  Duonebs q6h  Stop Rocephin and Azithromycin   c/w Lasix 40mg IV q12h  add Metoprolol 12.5 or 25mg BID as BP tolerates  Trend Troponins   I/O's and Low Na diet   c/w all other home meds     d/w Cardiology and care appreciated     Pulmonary: Outpt PFTs and Sleep Study (Obesity/Asthma)    MEDICATIONS  (STANDING):  albuterol    90 MICROgram(s) HFA Inhaler 2 Puff(s) Inhalation two times a day  albuterol/ipratropium for Nebulization 3 milliLiter(s) Nebulizer every 6 hours  azithromycin   Tablet 500 milliGRAM(s) Oral daily  budesonide 160 MICROgram(s)/formoterol 4.5 MICROgram(s) Inhaler 2 Puff(s) Inhalation two times a day  enoxaparin Injectable 40 milliGRAM(s) SubCutaneous every 24 hours  ferrous    sulfate 325 milliGRAM(s) Oral daily  furosemide   Injectable 40 milliGRAM(s) IV Push two times a day  influenza  Vaccine (HIGH DOSE) 0.7 milliLiter(s) IntraMuscular once  montelukast 10 milliGRAM(s) Oral daily  predniSONE   Tablet 40 milliGRAM(s) Oral daily    GI/DVT Proph       Handoff:   Pending: Diuresis / resolution of ZELAYA / IV Lasix IV  / MARISOL and LHC Monday   Family: Pt makes own lilli decisions   Dispo: Home when ready / Acute     Risk Statement (NON-critical care).     On this date of service, level of risk to patient is considered: High.     Due to: direct pt care and interdisciplinary rounds:  Asthma / Dyspnea / HF New / Obesity.
Briefly, 67 year old woman for whom cardiology is consulted for NICM. Cath reviewed with non-obstructive CAD. Echo reviewed and LV thrombus cannot be excluded. Patient will need cardiac MRI. Transfer to 4T.

## 2023-03-06 NOTE — DISCHARGE NOTE PROVIDER - NSDCCPCAREPLAN_GEN_ALL_CORE_FT
PRINCIPAL DISCHARGE DIAGNOSIS  Diagnosis: Acute on chronic respiratory failure with hypercapnia  Assessment and Plan of Treatment: You came the hospital with shortness of breath likely cardiac in nature. You received a cardiac catheterization which was negative. Please continue the medications started as inpatient and follow up with cardiology.

## 2023-03-06 NOTE — PROGRESS NOTE ADULT - SUBJECTIVE AND OBJECTIVE BOX
Outpt cardiologist:    Reason for Consult:     HISTORY OF PRESENT ILLNESS:  67-year-old female past medical history of asthma with multiple hospitalizations without intubation, last was 15 years ago, presents with SOB. History goes back to 3 weeks ago where patient had SOB with cough, chest tightness and had to use her Symbicort and albuterol rescue inhaler more often. Patient use her Symbicort inhaler twice daily at baseline. Over the last 3 days her shortness of breath acutely worsened where she is using her albuterol inhaler every 15 to 20 minutes with no relief. Her oxygen saturation is 95% at baseline, but it decreased to 92%. Patient was picked up by EMS and given DuoNebs that made her feel better.  No nausea, vomiting, fever, chest pain, back pain, abdominal pain.  No sputum production.    In ED VS insignifcant except for , oxygen saturation 95% RA  Labs significant for VBG pH 7.31, pCO2 63, RVP negative  Imaging wet read of CXR showing RLL opacity. However, this may have been present on previous CXR  In ED, treated with azithromycin, ceftriaxone, duoneb, solumedrol 125 mg, zofran.  Admitted for asthma exacerbation and possible pneumonia   (01 Mar 2023 00:42)      Cardiology Fellow Notes    Cardiology Fellow Notes  Patient without known cardiac history   Presented for shortness of breath and treated for asthma and pneumonia  Also had congestion and was diuresed     Eventually workup found an elevated BNP and an echo showed an globally decreased EF of 20% and severe MR.     Currently the patient is feeling well. She lying flat. No shortness of breath or orthopnea.   Mild LE swelling    3/6/2023  Cardiology reconsulted to follow up post cath   The patient feels well.   Cardiac cath without obstructive disease   Tele showed NSVT longest of 20 beats on 3/3/2023 at 2209      PAST MEDICAL & SURGICAL HISTORY  Asthma    HTN (hypertension)    H/O total knee replacement        FAMILY HISTORY:  FAMILY HISTORY:      SOCIAL HISTORY:  Social History:  Smoked for a few years a pack every few days, social drinker, no drugs (01 Mar 2023 00:42)      ALLERGIES:  [This allergen will not trigger allergy alert] environmental allegies (Unknown)  No Known Drug Allergies      MEDICATIONS:  albuterol    90 MICROgram(s) HFA Inhaler 2 Puff(s) Inhalation two times a day  albuterol/ipratropium for Nebulization 3 milliLiter(s) Nebulizer every 6 hours  budesonide 160 MICROgram(s)/formoterol 4.5 MICROgram(s) Inhaler 2 Puff(s) Inhalation two times a day  enoxaparin Injectable 40 milliGRAM(s) SubCutaneous every 24 hours  ferrous    sulfate 325 milliGRAM(s) Oral daily  furosemide    Tablet 40 milliGRAM(s) Oral two times a day  influenza  Vaccine (HIGH DOSE) 0.7 milliLiter(s) IntraMuscular once  metoprolol tartrate 25 milliGRAM(s) Oral two times a day  montelukast 10 milliGRAM(s) Oral daily  sacubitril 24 mG/valsartan 26 mG 1 Tablet(s) Oral two times a day  sodium chloride 0.9%. 1000 milliLiter(s) (100 mL/Hr) IV Continuous <Continuous>    PRN:      HOME MEDICATIONS:  Home Medications:  Albuterol (Eqv-Ventolin HFA) 90 mcg/inh inhalation aerosol: 2 puff(s) inhaled every 6 hours (01 Mar 2023 01:35)  albuterol 0.63 mg/3 mL (0.021%) inhalation solution:  (18 Mar 2018 10:02)  ipratropium-albuterol 0.5 mg-2.5 mg/3 mL inhalation solution: 3 milliliter(s) inhaled every 6 hours (06 Mar 2023 14:14)  Singulair: orally once a day (18 Mar 2018 10:02)  Symbicort 160 mcg-4.5 mcg/inh inhalation aerosol: 2 puff(s) inhaled 2 times a day (01 Mar 2023 01:33)      VITALS:   T(F): 97.5 (03-06 @ 05:05), Max: 98 (03-05 @ 04:57)  HR: 98 (03-06 @ 05:05) (84 - 101)  BP: 102/59 (03-06 @ 05:05) (91/51 - 142/82)  BP(mean): --  RR: 18 (03-05 @ 19:52) (18 - 18)  SpO2: 96% (03-04 @ 20:15) (96% - 98%)    I&O's Summary    05 Mar 2023 07:01  -  06 Mar 2023 07:00  --------------------------------------------------------  IN: 440 mL / OUT: 0 mL / NET: 440 mL        REVIEW OF SYSTEMS:  CONSTITUTIONAL: No weakness, fevers or chills  HEENT: No visual changes, neck/ear pain  RESPIRATORY: No cough, sob  CARDIOVASCULAR: See HPI  GASTROINTESTINAL: No abdominal pain. No nausea, vomiting, diarrhea   GENITOURINARY: No dysuria, frequency or hematuria  NEUROLOGICAL: No new focal deficits  SKIN: No new rashes    PHYSICAL EXAM:  General: Not in distress.  Non-toxic appearing.   Cardio: regular, S1, S2, + murmur  Pulm: B/L BS.  No wheezing / crackles / rales  Abdomen: Soft, non-tender, non-distended. Normoactive bowel sounds  Extremities: No edema b/l le  Neuro: A&O x3. No focal deficits    LABS:                        12.5   5.78  )-----------( 304      ( 06 Mar 2023 06:51 )             40.1     03-06    142  |  104  |  21<H>  ----------------------------<  101<H>  4.2   |  28  |  0.9    Ca    8.5      06 Mar 2023 06:51  Mg     2.3     03-06    TPro  5.7<L>  /  Alb  3.5  /  TBili  0.3  /  DBili  x   /  AST  26  /  ALT  43<H>  /  AlkPhos  83  03-06    Troponin trend:    COVID-19 PCR: NotDetec (04 Mar 2023 08:45)      IMAGING:  -TTE:  < from: TTE Echo Complete w/ Contrast w/ Doppler (03.02.23 @ 14:38) >  Summary:   1. Left ventricular ejection fraction, by visual estimation, is 20 to   25%.   2. Severely decreased global left ventricular systolic function.   3. Mildly enlarged left atrium.   4. Normal right atrial size.   5. Mild thickening of the anterior and posterior mitral valve leaflets.   6. Severe mitral valve regurgitation.   7. Mild tricuspid regurgitation.   8. Mild aortic regurgitation.   9. Endocardial visualization was enhanced with intravenous echo contrast.      -CATHETERIZATION:  FINDINGS:     Coronary Dominance: Right dominate      LM: No significant disease    LAD: No significant disease    CX: No significant disease    RCA: No significant disease     LVEDP: 12 mmHg    ECG:      TELEMETRY EVENTS:    NSVT   Outpt cardiologist:    Reason for Consult:     HISTORY OF PRESENT ILLNESS:  67-year-old female past medical history of asthma with multiple hospitalizations without intubation, last was 15 years ago, presents with SOB. History goes back to 3 weeks ago where patient had SOB with cough, chest tightness and had to use her Symbicort and albuterol rescue inhaler more often. Patient use her Symbicort inhaler twice daily at baseline. Over the last 3 days her shortness of breath acutely worsened where she is using her albuterol inhaler every 15 to 20 minutes with no relief. Her oxygen saturation is 95% at baseline, but it decreased to 92%. Patient was picked up by EMS and given DuoNebs that made her feel better.  No nausea, vomiting, fever, chest pain, back pain, abdominal pain.  No sputum production.    In ED VS insignifcant except for , oxygen saturation 95% RA  Labs significant for VBG pH 7.31, pCO2 63, RVP negative  Imaging wet read of CXR showing RLL opacity. However, this may have been present on previous CXR  In ED, treated with azithromycin, ceftriaxone, duoneb, solumedrol 125 mg, zofran.  Admitted for asthma exacerbation and possible pneumonia   (01 Mar 2023 00:42)      Cardiology Fellow Notes    Cardiology Fellow Notes  Patient without known cardiac history   Presented for shortness of breath and treated for asthma and pneumonia  Also had congestion and was diuresed     Eventually workup found an elevated BNP and an echo showed an globally decreased EF of 20% and severe MR.     Currently the patient is feeling well. She lying flat. No shortness of breath or orthopnea.   Mild LE swelling    3/6/2023  Cardiology reconsulted to follow up post cath   The patient feels well.   Cardiac cath without obstructive disease   Tele showed NSVT longest of 20 beats on 3/3/2023 at 2209      PAST MEDICAL & SURGICAL HISTORY  Asthma    HTN (hypertension)    H/O total knee replacement        FAMILY HISTORY:  FAMILY HISTORY: no family history of premature CAD or SCD      SOCIAL HISTORY:  Social History:  Smoked for a few years a pack every few days, social drinker, no drugs (01 Mar 2023 00:42)      ALLERGIES:  [This allergen will not trigger allergy alert] environmental allegies (Unknown)  No Known Drug Allergies      MEDICATIONS:  albuterol    90 MICROgram(s) HFA Inhaler 2 Puff(s) Inhalation two times a day  albuterol/ipratropium for Nebulization 3 milliLiter(s) Nebulizer every 6 hours  budesonide 160 MICROgram(s)/formoterol 4.5 MICROgram(s) Inhaler 2 Puff(s) Inhalation two times a day  enoxaparin Injectable 40 milliGRAM(s) SubCutaneous every 24 hours  ferrous    sulfate 325 milliGRAM(s) Oral daily  furosemide    Tablet 40 milliGRAM(s) Oral two times a day  influenza  Vaccine (HIGH DOSE) 0.7 milliLiter(s) IntraMuscular once  metoprolol tartrate 25 milliGRAM(s) Oral two times a day  montelukast 10 milliGRAM(s) Oral daily  sacubitril 24 mG/valsartan 26 mG 1 Tablet(s) Oral two times a day  sodium chloride 0.9%. 1000 milliLiter(s) (100 mL/Hr) IV Continuous <Continuous>    PRN:      HOME MEDICATIONS:  Home Medications:  Albuterol (Eqv-Ventolin HFA) 90 mcg/inh inhalation aerosol: 2 puff(s) inhaled every 6 hours (01 Mar 2023 01:35)  albuterol 0.63 mg/3 mL (0.021%) inhalation solution:  (18 Mar 2018 10:02)  ipratropium-albuterol 0.5 mg-2.5 mg/3 mL inhalation solution: 3 milliliter(s) inhaled every 6 hours (06 Mar 2023 14:14)  Singulair: orally once a day (18 Mar 2018 10:02)  Symbicort 160 mcg-4.5 mcg/inh inhalation aerosol: 2 puff(s) inhaled 2 times a day (01 Mar 2023 01:33)      VITALS:   T(F): 97.5 (03-06 @ 05:05), Max: 98 (03-05 @ 04:57)  HR: 98 (03-06 @ 05:05) (84 - 101)  BP: 102/59 (03-06 @ 05:05) (91/51 - 142/82)  BP(mean): --  RR: 18 (03-05 @ 19:52) (18 - 18)  SpO2: 96% (03-04 @ 20:15) (96% - 98%)    I&O's Summary    05 Mar 2023 07:01  -  06 Mar 2023 07:00  --------------------------------------------------------  IN: 440 mL / OUT: 0 mL / NET: 440 mL        REVIEW OF SYSTEMS:  CONSTITUTIONAL: No weakness, fevers or chills  HEENT: No visual changes, neck/ear pain  RESPIRATORY: No cough, sob  CARDIOVASCULAR: See HPI  GASTROINTESTINAL: No abdominal pain. No nausea, vomiting, diarrhea   GENITOURINARY: No dysuria, frequency or hematuria  NEUROLOGICAL: No new focal deficits  SKIN: No new rashes  10 point ROS completed; negative except as stated in HPI    PHYSICAL EXAM:  General: Not in distress.  Non-toxic appearing  HEENT: PERRLA, EOMI  Neck: supple, no JVD  Cardio: regular, S1, S2, + murmur  Pulm: B/L BS.  No wheezing / crackles / rales  Abdomen: Soft, non-tender, non-distended. Normoactive bowel sounds  Extremities: No edema b/l le  Neuro: A&O x3. No focal deficits    LABS:                        12.5   5.78  )-----------( 304      ( 06 Mar 2023 06:51 )             40.1     03-06    142  |  104  |  21<H>  ----------------------------<  101<H>  4.2   |  28  |  0.9    Ca    8.5      06 Mar 2023 06:51  Mg     2.3     03-06    TPro  5.7<L>  /  Alb  3.5  /  TBili  0.3  /  DBili  x   /  AST  26  /  ALT  43<H>  /  AlkPhos  83  03-06    Troponin trend:    COVID-19 PCR: NotDetec (04 Mar 2023 08:45)      IMAGING:  -TTE:  < from: TTE Echo Complete w/ Contrast w/ Doppler (03.02.23 @ 14:38) >  Summary:   1. Left ventricular ejection fraction, by visual estimation, is 20 to   25%.   2. Severely decreased global left ventricular systolic function.   3. Mildly enlarged left atrium.   4. Normal right atrial size.   5. Mild thickening of the anterior and posterior mitral valve leaflets.   6. Severe mitral valve regurgitation.   7. Mild tricuspid regurgitation.   8. Mild aortic regurgitation.   9. Endocardial visualization was enhanced with intravenous echo contrast.      -CATHETERIZATION:  FINDINGS:     Coronary Dominance: Right dominate      LM: No significant disease    LAD: No significant disease    CX: No significant disease    RCA: No significant disease     LVEDP: 12 mmHg    ECG:      TELEMETRY EVENTS:    NSVT

## 2023-03-06 NOTE — DISCHARGE NOTE PROVIDER - CARE PROVIDER_API CALL
Juancarlos Bernabe)  Cardiovascular Disease; Internal Medicine; Interventional Cardiology; Nuclear Cardiology  [address] [place] [address], [place] [address]  Phone: [phone]  Fax: [phone]  Follow Up Time: 1 week   Rosalinda Brady)  Cardiovascular Disease; Internal Medicine  [address] [place] [address], [address]  [place] [address]  Phone: [phone]  Fax: [phone]  Established Patient  Scheduled Appointment: 03/22/2023    Ankur Cotter; MD)  Adv Heart Fail Trnsplnt Cardio; Cardiovascular Disease; Internal Medicine  [address] [place] [address], [address] [place] [address]  Phone: [phone]  Fax: [phone]  Follow Up Time: 2 weeks

## 2023-03-06 NOTE — PROGRESS NOTE ADULT - SUBJECTIVE AND OBJECTIVE BOX
Patient is a 67y old  Female who presents with a chief complaint of asthma exacerbation, pneumonia (05 Mar 2023 14:44)        Over Night Events:    Appears comfortable         ROS:  See HPI    PHYSICAL EXAM    ICU Vital Signs Last 24 Hrs  T(C): 36.4 (06 Mar 2023 05:05), Max: 36.7 (05 Mar 2023 19:52)  T(F): 97.5 (06 Mar 2023 05:05), Max: 98 (05 Mar 2023 19:52)  HR: 98 (06 Mar 2023 05:05) (85 - 98)  BP: 102/59 (06 Mar 2023 05:05) (91/51 - 102/59)  BP(mean): --  ABP: --  ABP(mean): --  RR: 18 (05 Mar 2023 19:52) (18 - 18)  SpO2: --        General: NAD   HEENT: SIDNEY             Lymphatic system: No cervical LN   Lungs: Bilateral BS  Cardiovascular: Regular   Gastrointestinal: Soft, Positive BS  Extremities: No clubbing.  Moves extremities.  Full Range of motion   Skin: Warm, intact  Neurological: No motor or sensory deficit       03-05-23 @ 07:01  -  03-06-23 @ 07:00  --------------------------------------------------------  IN:    Oral Fluid: 440 mL  Total IN: 440 mL    OUT:  Total OUT: 0 mL    Total NET: 440 mL          LABS:                            12.5   5.78  )-----------( 304      ( 06 Mar 2023 06:51 )             40.1                                               03-06    142  |  104  |  21<H>  ----------------------------<  101<H>  4.2   |  28  |  0.9    Ca    8.5      06 Mar 2023 06:51  Mg     2.3     03-06    TPro  5.7<L>  /  Alb  3.5  /  TBili  0.3  /  DBili  x   /  AST  26  /  ALT  43<H>  /  AlkPhos  83  03-06                                                                                           LIVER FUNCTIONS - ( 06 Mar 2023 06:51 )  Alb: 3.5 g/dL / Pro: 5.7 g/dL / ALK PHOS: 83 U/L / ALT: 43 U/L / AST: 26 U/L / GGT: x                                                                                                                                       MEDICATIONS  (STANDING):  albuterol    90 MICROgram(s) HFA Inhaler 2 Puff(s) Inhalation two times a day  albuterol/ipratropium for Nebulization 3 milliLiter(s) Nebulizer every 6 hours  budesonide 160 MICROgram(s)/formoterol 4.5 MICROgram(s) Inhaler 2 Puff(s) Inhalation two times a day  enoxaparin Injectable 40 milliGRAM(s) SubCutaneous every 24 hours  ferrous    sulfate 325 milliGRAM(s) Oral daily  furosemide    Tablet 40 milliGRAM(s) Oral two times a day  influenza  Vaccine (HIGH DOSE) 0.7 milliLiter(s) IntraMuscular once  metoprolol tartrate 25 milliGRAM(s) Oral two times a day  montelukast 10 milliGRAM(s) Oral daily  sacubitril 24 mG/valsartan 26 mG 1 Tablet(s) Oral two times a day    MEDICATIONS  (PRN):      Xrays: pulm edema                                                                                     ECHO

## 2023-03-06 NOTE — DISCHARGE NOTE PROVIDER - ATTENDING DISCHARGE PHYSICAL EXAMINATION:
67yoF with asthma (multiple hospitalizations, no recent intubations) and iron deficiency anemia who presented to the ED with SOB, found to have NICM with LVE 20-25% (C on 3/06/23 with normal coronary arteries) and severe MR. Patient is being initiated on GDMT, however her course was c/b fever and hypotension in the s/o influenza. Started on Tamiflu and GDMT discontinued.     For her NICM, serum free light chain ratio is normal and PYP scan is normal, thus ruling out AL and TTR amyloid, respectively. Patient will be discharged with a LifeVest, on Toprol 50 mg daily and Farxiga 10 mg daily. The remainder of her GDMT will be reinitiated in the outpatient setting with HF. She was instructed to obtain her family's genetic testing for their reported FHx of cardiac amyloid.    67yoF with asthma (multiple hospitalizations, no recent intubations) and iron deficiency anemia who presented to the ED with SOB, found to have NICM with LVE 20-25% (C on 3/06/23 with normal coronary arteries) and severe MR. Patient is being initiated on GDMT, however her course was c/b fever and hypotension in the s/o influenza. Started on Tamiflu and GDMT discontinued. She will be discharged on Toprol 50 mg daily, Farxiga 10 mg daily, Eliquis 5 mg BID, and Lasix 40 mg PRN.    For her NICM, serum free light chain ratio is normal and PYP scan is normal, thus ruling out AL and TTR amyloid, respectively. Patient will be discharged with a LifeVest, on Toprol 50 mg daily and Farxiga 10 mg daily. The remainder of her GDMT will be reinitiated in the outpatient setting with HF. She was instructed to obtain her family's genetic testing for their reported FHx of cardiac amyloid.

## 2023-03-06 NOTE — DISCHARGE NOTE PROVIDER - PROVIDER TOKENS
PROVIDER:[TOKEN:[[id number]],FOLLOWUP:[1 week]] PROVIDER:[TOKEN:[[id number]],SCHEDULEDAPPT:[03/22/2023],ESTABLISHEDPATIENT:[T]],PROVIDER:[TOKEN:[[id number]],FOLLOWUP:[2 weeks]]

## 2023-03-06 NOTE — PROGRESS NOTE ADULT - ASSESSMENT
67-year-old female past medical history of asthma with multiple hospitalizations without intubation, last was 15 years ago, iron deficiency anemia presents with SOB found to have HFrEF and severe MR    # Acute HFrEF:   New diagnosis, patient presented with SOB and Cough, leg edema. Pro-BNP  CXR showed bilateral interstitial infiltrates.   Echo LVEF 20-25%, severe MR< mild TR, mild AR.   s/p Lasix 40mg IV, switched to Lasix 40mg Po daily.   Start on Metoprolol 12.5mg BID and Entresto   Cardiology plan for cardiac cath. Patient reports Family history of Amyloidosis. ??     # Ventricular Tachycardia: multiple episodes of non sustained V-tach  Continue Metoprolol, will increase the dose.     # Wheezing, history of asthma - Improved,   Likely cardiac wheezing, lungs are clear.   Continue Symbicort. Pulmonary follow up   Off oxygen now; no wheezing on exam   Continue albuterol PRN; continue symbicort and montelukast  Evidence of hypercapnia on VBG; chronic and compensated  Will need outpatient sleep study; may need BIPAP during sleep      # Iron deficiency anemia  Continue ferrous sulfate daily    #DVT prophylaxis- lovenox 40 mg daily    Pending cardiac cath today     67-year-old female past medical history of asthma with multiple hospitalizations without intubation, last was 15 years ago, iron deficiency anemia presents with SOB found to have HFrEF and severe MR    # Acute HFrEF:   - New diagnosis, patient presented with SOB and Cough, leg edema. Pro-BNP  - CXR showed bilateral interstitial infiltrates.   - Echo LVEF 20-25%, severe MR< mild TR, mild AR.   - Lasix 40mg Po daily.   - Metoprolol 12.5mg BID and Entresto   - Cardiology plan for cardiac cath today    # Ventricular Tachycardia: multiple episodes of non sustained V-tach  - Continue Metoprolol, will increase the dose.     # Wheezing, history of asthma - Improved,   - Likely cardiac wheezing, lungs are clear.   - Evidence of hypercapnia on VBG; chronic and compensated  - Off oxygen now; no wheezing on exam   - Continue albuterol PRN; continue symbicort and montelukast  - Will need outpatient sleep study; may need BIPAP during sleep    # Iron deficiency anemia  - Continue ferrous sulfate daily    #DVT prophylaxis- lovenox 40 mg daily        # Pending  - cardiac cath today

## 2023-03-06 NOTE — PROGRESS NOTE ADULT - SUBJECTIVE AND OBJECTIVE BOX
PASCUAL PANTOJA 67y Female  MRN#: [id number]   CODE STATUS:    Hospital Day: 6d    SUBJECTIVE  Hospital Course  67-year-old female     past medical history of asthma with multiple hospitalizations without intubation, last was 15 years ago,     presents with SOB.     History goes back to 3 weeks ago where patient had SOB with cough, chest tightness and had to use her Symbicort and albuterol rescue inhaler more often. Patient use her Symbicort inhaler twice daily at baseline. Over the last 3 days her shortness of breath acutely worsened where she is using her albuterol inhaler every 15 to 20 minutes with no relief. Her oxygen saturation is 95% at baseline, but it decreased to 92%. Patient was picked up by EMS and given DuoNebs that made her feel better.  No nausea, vomiting, fever, chest pain, back pain, abdominal pain.  No sputum production.                                              ----------------------------------------------------------  OBJECTIVE  PAST MEDICAL & SURGICAL HISTORY  Asthma    HTN (hypertension)    H/O total knee replacement                                              -----------------------------------------------------------  ALLERGIES:  [This allergen will not trigger allergy alert] environmental allegies (Unknown)  No Known Drug Allergies                                            ------------------------------------------------------------    HOME MEDICATIONS  Home Medications:  Albuterol (Eqv-Ventolin HFA) 90 mcg/inh inhalation aerosol: 2 puff(s) inhaled every 6 hours (01 Mar 2023 01:35)  albuterol 0.63 mg/3 mL (0.021%) inhalation solution:  (18 Mar 2018 10:02)  Singulair: orally once a day (18 Mar 2018 10:02)  Symbicort 160 mcg-4.5 mcg/inh inhalation aerosol: 2 puff(s) inhaled 2 times a day (01 Mar 2023 01:33)                           MEDICATIONS:  STANDING MEDICATIONS  albuterol    90 MICROgram(s) HFA Inhaler 2 Puff(s) Inhalation two times a day  albuterol/ipratropium for Nebulization 3 milliLiter(s) Nebulizer every 6 hours  budesonide 160 MICROgram(s)/formoterol 4.5 MICROgram(s) Inhaler 2 Puff(s) Inhalation two times a day  enoxaparin Injectable 40 milliGRAM(s) SubCutaneous every 24 hours  ferrous    sulfate 325 milliGRAM(s) Oral daily  furosemide    Tablet 40 milliGRAM(s) Oral two times a day  influenza  Vaccine (HIGH DOSE) 0.7 milliLiter(s) IntraMuscular once  metoprolol tartrate 25 milliGRAM(s) Oral two times a day  montelukast 10 milliGRAM(s) Oral daily  sacubitril 24 mG/valsartan 26 mG 1 Tablet(s) Oral two times a day    PRN MEDICATIONS                                            ------------------------------------------------------------  VITAL SIGNS: Last 24 Hours  T(C): 36.4 (06 Mar 2023 05:05), Max: 36.7 (05 Mar 2023 19:52)  T(F): 97.5 (06 Mar 2023 05:05), Max: 98 (05 Mar 2023 19:52)  HR: 98 (06 Mar 2023 05:05) (85 - 98)  BP: 102/59 (06 Mar 2023 05:05) (91/51 - 102/59)  BP(mean): --  RR: 18 (05 Mar 2023 19:52) (18 - 18)  SpO2: --      03-05-23 @ 07:01  -  03-06-23 @ 07:00  --------------------------------------------------------  IN: 440 mL / OUT: 0 mL / NET: 440 mL                                             --------------------------------------------------------------  LABS:                        12.5   5.78  )-----------( 304      ( 06 Mar 2023 06:51 )             40.1     03-06    142  |  104  |  21<H>  ----------------------------<  101<H>  4.2   |  28  |  0.9    Ca    8.5      06 Mar 2023 06:51  Mg     2.3     03-06    TPro  5.7<L>  /  Alb  3.5  /  TBili  0.3  /  DBili  x   /  AST  26  /  ALT  43<H>  /  AlkPhos  83  03-06

## 2023-03-06 NOTE — PROGRESS NOTE ADULT - SUBJECTIVE AND OBJECTIVE BOX
PASCUAL PANTOJA  67y  Female      Patient is a 67y old  Female who presents with a chief complaint of asthma exacerbation, pneumonia (06 Mar 2023 10:02)      INTERVAL HPI/OVERNIGHT EVENTS:  She feels ok, she went for cardiac cath today.   Vital Signs Last 24 Hrs  T(C): 36.4 (06 Mar 2023 05:05), Max: 36.7 (05 Mar 2023 19:52)  T(F): 97.5 (06 Mar 2023 05:05), Max: 98 (05 Mar 2023 19:52)  HR: 98 (06 Mar 2023 05:05) (85 - 98)  BP: 102/59 (06 Mar 2023 05:05) (91/51 - 102/59)  BP(mean): --  RR: 18 (05 Mar 2023 19:52) (18 - 18)  SpO2: --          03-05-23 @ 07:01  -  03-06-23 @ 07:00  --------------------------------------------------------  IN: 440 mL / OUT: 0 mL / NET: 440 mL            Consultant(s) Notes Reviewed:  [x ] YES  [ ] NO          MEDICATIONS  (STANDING):  albuterol    90 MICROgram(s) HFA Inhaler 2 Puff(s) Inhalation two times a day  albuterol/ipratropium for Nebulization 3 milliLiter(s) Nebulizer every 6 hours  budesonide 160 MICROgram(s)/formoterol 4.5 MICROgram(s) Inhaler 2 Puff(s) Inhalation two times a day  enoxaparin Injectable 40 milliGRAM(s) SubCutaneous every 24 hours  ferrous    sulfate 325 milliGRAM(s) Oral daily  furosemide    Tablet 40 milliGRAM(s) Oral two times a day  influenza  Vaccine (HIGH DOSE) 0.7 milliLiter(s) IntraMuscular once  metoprolol tartrate 25 milliGRAM(s) Oral two times a day  montelukast 10 milliGRAM(s) Oral daily  sacubitril 24 mG/valsartan 26 mG 1 Tablet(s) Oral two times a day  sodium chloride 0.9%. 1000 milliLiter(s) (100 mL/Hr) IV Continuous <Continuous>    MEDICATIONS  (PRN):      LABS                          12.5   5.78  )-----------( 304      ( 06 Mar 2023 06:51 )             40.1     03-06    142  |  104  |  21<H>  ----------------------------<  101<H>  4.2   |  28  |  0.9    Ca    8.5      06 Mar 2023 06:51  Mg     2.3     03-06    TPro  5.7<L>  /  Alb  3.5  /  TBili  0.3  /  DBili  x   /  AST  26  /  ALT  43<H>  /  AlkPhos  83  03-06          Lactate Trend        CAPILLARY BLOOD GLUCOSE          Culture - Legionella (collected 03-01-23 @ 13:15)  Source: .Legionella None  Preliminary Report (03-04-23 @ 00:24):    No Legionella species isolated        RADIOLOGY & ADDITIONAL TESTS:    Imaging Personally Reviewed:  [ ] YES  [ ] NO    HEALTH ISSUES - PROBLEM Dx:          PHYSICAL EXAM:  GENERAL: NAD, well-developed.  HEAD:  Atraumatic, Normocephalic.  EYES: EOMI, PERRLA, conjunctiva and sclera clear.  NECK: Supple, No JVD.  CHEST/LUNG: Clear to auscultation bilaterally; No wheeze.  HEART: Regular rate and rhythm; S1 S2.   ABDOMEN: Soft, Nontender, Nondistended; Bowel sounds present.  EXTREMITIES:  2+ Peripheral Pulses, No clubbing, cyanosis, or edema.  PSYCH: AAOx3.  NEUROLOGY: non-focal.  SKIN: No rashes or lesions.

## 2023-03-06 NOTE — DISCHARGE NOTE NURSING/CASE MANAGEMENT/SOCIAL WORK - PATIENT PORTAL LINK FT
You can access the FollowMyHealth Patient Portal offered by [facility] by registering at the following website: [email or web address]. By joining [employer]s FollowMyHealth portal, you will also be able to view your health information using other applications (apps) compatible with our system.
No

## 2023-03-06 NOTE — DISCHARGE NOTE PROVIDER - HOSPITAL COURSE
67-year-old female past medical history of asthma with multiple hospitalizations without intubation, last was 15 years ago, iron deficiency anemia presents with SOB. History goes back to 3 weeks ago where patient had SOB with cough and had to use her Symbicort and albuterol rescue inhaler more often. Patient use her Symbicort inhaler twice daily at baseline. Over the last 3 days her shortness of breath acutely worsened where she is using her albuterol inhaler every 15 to 20 minutes with no relief. Her oxygen saturation is 95% at baseline, but it decreased to 92%. Patient was picked up by EMS and given DuoNebs that made her feel better.  No nausea, vomiting, fever, chest pain, back pain, abdominal pain. No sputum production.  Patient was managed for acute HFrEF. Nonischemic Cardiomyopathy / CXR showed bilateral interstitial infiltrates / Echo LVEF 20-25%, severe MR< mild TR, mild AR.   Start on Metoprolol 25mg BID and Entresto 24/26mg BID. Cardiac cath 3/6 showed normal Coronary arteries.   Cardiology follow up outpatient.      67-year-old female past medical history of asthma with multiple hospitalizations without intubation, last was 15 years ago, iron deficiency anemia presents with SOB. History goes back to 3 weeks ago where patient had SOB with cough and had to use her Symbicort and albuterol rescue inhaler more often. Patient use her Symbicort inhaler twice daily at baseline. Over the last 3 days her shortness of breath acutely worsened where she is using her albuterol inhaler every 15 to 20 minutes with no relief. Her oxygen saturation is 95% at baseline, but it decreased to 92%. Patient was picked up by EMS and given DuoNebs that made her feel better.  No nausea, vomiting, fever, chest pain, back pain, abdominal pain. No sputum production.  Patient was managed for acute HFrEF. Nonischemic Cardiomyopathy / CXR showed bilateral interstitial infiltrates / Echo LVEF 20-25%, severe MR< mild TR, mild AR.  Patient was discharged with LifeVest   Patient is being discharged Metoprolol 50 mg QD and farxiga, patient could not tolerate Entresto 2/2 hypotension. Cardiac cath 3/6 showed normal Coronary arteries.    Patient will follow up with Dr. Brady in 2 weeks and Dr. Mayers in two weeks.

## 2023-03-06 NOTE — DISCHARGE NOTE PROVIDER - CARE PROVIDERS DIRECT ADDRESSES
[email or web address]

## 2023-03-06 NOTE — DISCHARGE NOTE PROVIDER - NSDCMRMEDTOKEN_GEN_ALL_CORE_FT
Albuterol (Eqv-Ventolin HFA) 90 mcg/inh inhalation aerosol: 2 puff(s) inhaled every 6 hours  albuterol 0.63 mg/3 mL (0.021%) inhalation solution:   ipratropium-albuterol 0.5 mg-2.5 mg/3 mL inhalation solution: 3 milliliter(s) inhaled every 6 hours  Singulair: orally once a day  Symbicort 160 mcg-4.5 mcg/inh inhalation aerosol: 2 puff(s) inhaled 2 times a day   Albuterol (Eqv-Ventolin HFA) 90 mcg/inh inhalation aerosol: 2 puff(s) inhaled every 6 hours  albuterol 0.63 mg/3 mL (0.021%) inhalation solution:   dapagliflozin 10 mg oral tablet: 1 tab(s) orally every 24 hours  ipratropium-albuterol 0.5 mg-2.5 mg/3 mL inhalation solution: 3 milliliter(s) inhaled every 6 hours  Singulair: orally once a day  Symbicort 160 mcg-4.5 mcg/inh inhalation aerosol: 2 puff(s) inhaled 2 times a day   Albuterol (Eqv-Ventolin HFA) 90 mcg/inh inhalation aerosol: 2 puff(s) inhaled every 6 hours  albuterol 0.63 mg/3 mL (0.021%) inhalation solution:   apixaban 5 mg oral tablet: 1 tab(s) orally every 12 hours  dapagliflozin 10 mg oral tablet: 1 tab(s) orally every 24 hours  ipratropium-albuterol 0.5 mg-2.5 mg/3 mL inhalation solution: 3 milliliter(s) inhaled every 6 hours  metoprolol succinate 50 mg oral tablet, extended release: 1 tab(s) orally once a day  oseltamivir 75 mg oral capsule: 1 cap(s) orally 2 times a day  Singulair: orally once a day  Symbicort 160 mcg-4.5 mcg/inh inhalation aerosol: 2 puff(s) inhaled 2 times a day   Albuterol (Eqv-Ventolin HFA) 90 mcg/inh inhalation aerosol: 2 puff(s) inhaled every 6 hours  albuterol 0.63 mg/3 mL (0.021%) inhalation solution:   apixaban 5 mg oral tablet: 1 tab(s) orally every 12 hours  dapagliflozin 10 mg oral tablet: 1 tab(s) orally every 24 hours  ferrous sulfate 325 mg (65 mg elemental iron) oral tablet: 1 tab(s) orally once a day  furosemide 40 mg oral tablet: 1 tab(s) orally once a day, As Needed for leg swelling or shortness of breath  ipratropium-albuterol 0.5 mg-2.5 mg/3 mL inhalation solution: 3 milliliter(s) inhaled every 6 hours  metoprolol succinate 50 mg oral tablet, extended release: 1 tab(s) orally once a day  oseltamivir 75 mg oral capsule: 1 cap(s) orally 2 times a day  Singulair: orally once a day  Symbicort 160 mcg-4.5 mcg/inh inhalation aerosol: 2 puff(s) inhaled 2 times a day

## 2023-03-06 NOTE — PROGRESS NOTE ADULT - ASSESSMENT
IMPRESSION:    Acute hypoxemic respiratory failure - resolved   Acute asthma exacerbation - resolved   Severe persistent asthma   Pulmonary edema  HF with severely reduced EF   Obesity, likely JENNIFER/OHS    RECOMMENDATIONS:    Most recent CXR with pulmonary edema; slightly improved   Echo noted with severely reduced EF  Planned for cardiac cath; cardiology on board  Off oxygen now; no wheezing on exam   Continue albuterol PRN; continue symbicort and montelukast  Evidence of hypercapnia on VBG; chronic and compensated  Will need outpatient sleep study; may need BIPAP during sleep  On lovenox for DVT ppx; ambulate as tolerated   Will follow

## 2023-03-06 NOTE — DISCHARGE NOTE PROVIDER - NSDCFUADDAPPT_GEN_ALL_CORE_FT
Pt has star Pt F/U Appt with  Dr. Rosalinda Brady office phone # [phone] , schedule appointment for 4/18/23 at 2:20pm

## 2023-03-06 NOTE — PROGRESS NOTE ADULT - ASSESSMENT
Impression   # NICM dilated   # Severe MR  # NSVT  # Likely LV thrombus on echo with contrast   Family history of amyloid   Cath normal   Patient currently euvolemic clinically (LVEDP 12)     Recommendations   - Continue oral LAsix   - Continue GDMT for cardiomyopathy   - Continue tele  - Continue I/Os monitoring  - Keep K > 4.5 and Mg > 2.2  - EP evaluation   - Will need CMR as inpatient to evaluate cardiomyopathy and LV thrombus. She is very claustrophobic and may need anesthesia evaluation prior.   - Recommend starting Eliquis for full AC   - Trasnfer to [facility]    Discussed with attending.   Signature: Yohan BENITES, 1st year Cardiology Fellow   Impression   # NICM dilated   # Severe MR  # NSVT  # Likely LV thrombus on echo with contrast   Family history of amyloid   Cath normal   Patient currently euvolemic clinically (LVEDP 12)     Recommendations   - Continue oral LAsix   - Continue GDMT for cardiomyopathy (switch metoprolol tartrate to succinate on discharge)    - Continue tele  - Continue I/Os monitoring  - Keep K > 4.5 and Mg > 2.2  - EP evaluation   - Will need CMR as inpatient to evaluate cardiomyopathy and LV thrombus. She is very claustrophobic and may need anesthesia evaluation prior.   - Recommend starting Eliquis for full AC   - Trasnfer to [facility]    Discussed with attending.   Signature: Yohan BENITES, 1st year Cardiology Fellow

## 2023-03-06 NOTE — DISCHARGE NOTE NURSING/CASE MANAGEMENT/SOCIAL WORK - NSDCFUADDAPPT_GEN_ALL_CORE_FT
Pt has star Pt F/U Appt with  Dr. Rosalinda Brady office phone # [phone] , schedule appointment for 4/18/23 at 2:20pm

## 2023-03-06 NOTE — DISCHARGE NOTE NURSING/CASE MANAGEMENT/SOCIAL WORK - NSDCPEFALRISK_GEN_ALL_CORE
For information on Fall & Injury Prevention, visit: [email or web address] OR  [email or web address] OR  https://www.cdc.gov/steadi/patient.html

## 2023-03-06 NOTE — DISCHARGE NOTE PROVIDER - NSDCFUSCHEDAPPT_GEN_ALL_CORE_FT
Rosalinda Brady  [facility]  CARDIOLOGY [address] [place] [address]  Scheduled Appointment: 04/18/2023     Rosalinda Brady  [facility] Physician [facility]  CARDIOLOGY [address] [place] [address]  Scheduled Appointment: 03/22/2023

## 2023-03-06 NOTE — PROGRESS NOTE ADULT - ASSESSMENT
67-year-old female past medical history of asthma with multiple hospitalizations without intubation, last was 15 years ago, iron deficiency anemia presents with SOB. History goes back to 3 weeks ago where patient had SOB with cough and had to use her Symbicort and albuterol rescue inhaler more often. Patient use her Symbicort inhaler twice daily at baseline. Over the last 3 days her shortness of breath acutely worsened where she is using her albuterol inhaler every 15 to 20 minutes with no relief. Her oxygen saturation is 95% at baseline, but it decreased to 92%. Patient was picked up by EMS and given DuoNebs that made her feel better.  No nausea, vomiting, fever, chest pain, back pain, abdominal pain. No sputum production.    A/p:   Acute HFrEF:   Nonischemic Cardiomyopathy:   New diagnosis, patient presented with SOB and Cough, leg edema. Pro-BNP  CXR showed bilateral interstitial infiltrates.   Echo LVEF 20-25%, severe MR< mild TR, mild AR.   s/p Lasix 40mg IV, switched to Lasix 40mg Po daily.   Start on Metoprolol 25mg BID and Entresto 24/26mg BID.   Cardiac cath 3/6 showed normal Coronary arteries.   Cardiology follow up outpatient.   Patient reports Family history of Amyloidosis. ??     Ventricular Tachycardia: multiple episodes of non sustained V-tach  Continue Metoprolol, will increase the dose.     Wheezing, history of asthma Improved,   Likely cardiac wheezing, lungs are clear.   Continue Symbicort. Pulmonary follow up     Iron deficiency anemia  Continue ferrous sulfate daily    DVT prophylaxis: Lovenox 40 mg daily

## 2023-03-07 LAB
ALBUMIN SERPL ELPH-MCNC: 3.6 G/DL — SIGNIFICANT CHANGE UP (ref 3.5–5.2)
ALP SERPL-CCNC: 84 U/L — SIGNIFICANT CHANGE UP (ref 30–115)
ALT FLD-CCNC: 43 U/L — HIGH (ref 0–41)
ANION GAP SERPL CALC-SCNC: 9 MMOL/L — SIGNIFICANT CHANGE UP (ref 7–14)
AST SERPL-CCNC: 21 U/L — SIGNIFICANT CHANGE UP (ref 0–41)
BASOPHILS # BLD AUTO: 0.02 K/UL — SIGNIFICANT CHANGE UP (ref 0–0.2)
BASOPHILS NFR BLD AUTO: 0.4 % — SIGNIFICANT CHANGE UP (ref 0–1)
BILIRUB SERPL-MCNC: 0.3 MG/DL — SIGNIFICANT CHANGE UP (ref 0.2–1.2)
BUN SERPL-MCNC: 19 MG/DL — SIGNIFICANT CHANGE UP (ref 10–20)
CALCIUM SERPL-MCNC: 8.3 MG/DL — LOW (ref 8.4–10.5)
CHLORIDE SERPL-SCNC: 104 MMOL/L — SIGNIFICANT CHANGE UP (ref 98–110)
CO2 SERPL-SCNC: 28 MMOL/L — SIGNIFICANT CHANGE UP (ref 17–32)
CREAT SERPL-MCNC: 0.8 MG/DL — SIGNIFICANT CHANGE UP (ref 0.7–1.5)
EGFR: 82 ML/MIN/1.73M2 — SIGNIFICANT CHANGE UP
EOSINOPHIL # BLD AUTO: 0.19 K/UL — SIGNIFICANT CHANGE UP (ref 0–0.7)
EOSINOPHIL NFR BLD AUTO: 3.8 % — SIGNIFICANT CHANGE UP (ref 0–8)
GLUCOSE SERPL-MCNC: 96 MG/DL — SIGNIFICANT CHANGE UP (ref 70–99)
HCT VFR BLD CALC: 41.2 % — SIGNIFICANT CHANGE UP (ref 37–47)
HGB BLD-MCNC: 12.7 G/DL — SIGNIFICANT CHANGE UP (ref 12–16)
IMM GRANULOCYTES NFR BLD AUTO: 0.4 % — HIGH (ref 0.1–0.3)
IRON SATN MFR SERPL: 24 % — SIGNIFICANT CHANGE UP (ref 15–50)
IRON SATN MFR SERPL: 71 UG/DL — SIGNIFICANT CHANGE UP (ref 35–150)
LYMPHOCYTES # BLD AUTO: 1.87 K/UL — SIGNIFICANT CHANGE UP (ref 1.2–3.4)
LYMPHOCYTES # BLD AUTO: 37.9 % — SIGNIFICANT CHANGE UP (ref 20.5–51.1)
MAGNESIUM SERPL-MCNC: 2.1 MG/DL — SIGNIFICANT CHANGE UP (ref 1.8–2.4)
MCHC RBC-ENTMCNC: 27.5 PG — SIGNIFICANT CHANGE UP (ref 27–31)
MCHC RBC-ENTMCNC: 30.8 G/DL — LOW (ref 32–37)
MCV RBC AUTO: 89.2 FL — SIGNIFICANT CHANGE UP (ref 81–99)
MONOCYTES # BLD AUTO: 0.45 K/UL — SIGNIFICANT CHANGE UP (ref 0.1–0.6)
MONOCYTES NFR BLD AUTO: 9.1 % — SIGNIFICANT CHANGE UP (ref 1.7–9.3)
NEUTROPHILS # BLD AUTO: 2.39 K/UL — SIGNIFICANT CHANGE UP (ref 1.4–6.5)
NEUTROPHILS NFR BLD AUTO: 48.4 % — SIGNIFICANT CHANGE UP (ref 42.2–75.2)
NRBC # BLD: 0 /100 WBCS — SIGNIFICANT CHANGE UP (ref 0–0)
PLATELET # BLD AUTO: 291 K/UL — SIGNIFICANT CHANGE UP (ref 130–400)
POTASSIUM SERPL-MCNC: 4.3 MMOL/L — SIGNIFICANT CHANGE UP (ref 3.5–5)
POTASSIUM SERPL-SCNC: 4.3 MMOL/L — SIGNIFICANT CHANGE UP (ref 3.5–5)
PROT SERPL-MCNC: 5.7 G/DL — LOW (ref 6–8)
RBC # BLD: 4.62 M/UL — SIGNIFICANT CHANGE UP (ref 4.2–5.4)
RBC # FLD: 13.6 % — SIGNIFICANT CHANGE UP (ref 11.5–14.5)
SODIUM SERPL-SCNC: 141 MMOL/L — SIGNIFICANT CHANGE UP (ref 135–146)
TIBC SERPL-MCNC: 302 UG/DL — SIGNIFICANT CHANGE UP (ref 220–430)
UIBC SERPL-MCNC: 231 UG/DL — SIGNIFICANT CHANGE UP (ref 110–370)
WBC # BLD: 4.94 K/UL — SIGNIFICANT CHANGE UP (ref 4.8–10.8)
WBC # FLD AUTO: 4.94 K/UL — SIGNIFICANT CHANGE UP (ref 4.8–10.8)

## 2023-03-07 PROCEDURE — 99223 1ST HOSP IP/OBS HIGH 75: CPT

## 2023-03-07 PROCEDURE — 99232 SBSQ HOSP IP/OBS MODERATE 35: CPT

## 2023-03-07 PROCEDURE — 99233 SBSQ HOSP IP/OBS HIGH 50: CPT

## 2023-03-07 PROCEDURE — 99233 SBSQ HOSP IP/OBS HIGH 50: CPT | Mod: 25

## 2023-03-07 RX ORDER — DAPAGLIFLOZIN 10 MG/1
1 TABLET, FILM COATED ORAL
Qty: 30 | Refills: 1
Start: 2023-03-07 | End: 2023-05-05

## 2023-03-07 RX ORDER — GUAIFENESIN/DEXTROMETHORPHAN 600MG-30MG
10 TABLET, EXTENDED RELEASE 12 HR ORAL ONCE
Refills: 0 | Status: COMPLETED | OUTPATIENT
Start: 2023-03-07 | End: 2023-03-07

## 2023-03-07 RX ORDER — FUROSEMIDE 40 MG
40 TABLET ORAL DAILY
Refills: 0 | Status: DISCONTINUED | OUTPATIENT
Start: 2023-03-08 | End: 2023-03-09

## 2023-03-07 RX ORDER — METOPROLOL TARTRATE 50 MG
25 TABLET ORAL ONCE
Refills: 0 | Status: COMPLETED | OUTPATIENT
Start: 2023-03-07 | End: 2023-03-07

## 2023-03-07 RX ORDER — METOPROLOL TARTRATE 50 MG
25 TABLET ORAL ONCE
Refills: 0 | Status: DISCONTINUED | OUTPATIENT
Start: 2023-03-07 | End: 2023-03-08

## 2023-03-07 RX ORDER — METOPROLOL TARTRATE 50 MG
50 TABLET ORAL EVERY 12 HOURS
Refills: 0 | Status: DISCONTINUED | OUTPATIENT
Start: 2023-03-07 | End: 2023-03-10

## 2023-03-07 RX ADMIN — APIXABAN 5 MILLIGRAM(S): 2.5 TABLET, FILM COATED ORAL at 17:05

## 2023-03-07 RX ADMIN — SACUBITRIL AND VALSARTAN 1 TABLET(S): 24; 26 TABLET, FILM COATED ORAL at 17:05

## 2023-03-07 RX ADMIN — Medication 3 MILLILITER(S): at 14:53

## 2023-03-07 RX ADMIN — Medication 10 MILLILITER(S): at 22:30

## 2023-03-07 RX ADMIN — Medication 25 MILLIGRAM(S): at 17:05

## 2023-03-07 RX ADMIN — Medication 25 MILLIGRAM(S): at 05:44

## 2023-03-07 RX ADMIN — Medication 3 MILLILITER(S): at 08:59

## 2023-03-07 RX ADMIN — BUDESONIDE AND FORMOTEROL FUMARATE DIHYDRATE 2 PUFF(S): 160; 4.5 AEROSOL RESPIRATORY (INHALATION) at 20:07

## 2023-03-07 RX ADMIN — MONTELUKAST 10 MILLIGRAM(S): 4 TABLET, CHEWABLE ORAL at 11:20

## 2023-03-07 RX ADMIN — BUDESONIDE AND FORMOTEROL FUMARATE DIHYDRATE 2 PUFF(S): 160; 4.5 AEROSOL RESPIRATORY (INHALATION) at 08:16

## 2023-03-07 RX ADMIN — Medication 40 MILLIGRAM(S): at 05:44

## 2023-03-07 RX ADMIN — Medication 325 MILLIGRAM(S): at 11:20

## 2023-03-07 RX ADMIN — APIXABAN 5 MILLIGRAM(S): 2.5 TABLET, FILM COATED ORAL at 05:43

## 2023-03-07 RX ADMIN — Medication 3 MILLILITER(S): at 21:58

## 2023-03-07 NOTE — PROGRESS NOTE ADULT - ASSESSMENT
Assessment:  67 yr old female Pmhx of asthma with multiple hospitalizations without intubation last 15 years ago, iron deficiency anemia. Presented to ED  with SOB found to have HFrEF and severe MR. LHC on 3/6/2023 revealed non -obstructive CAD. Patient transferred  to cardiac tele for evaluation of nonischemic cardiomyopathy. Cardia MRI with anesthesia pending.    Problems discussed and associated plan    # nonischemic cardiomyopathy  #severe MR  - Acute HFrEF  - Echo LVEF 20-25%, severe MR< mild TR, mild AR. (LVEDP 12)  - Lasix 40mg Po daily.   - c/w Metoprolol 25 m BID  (switch metoprolol tartrate to succinate on discharge)   - c/w entresto 24/26  - Cath 3/6 : Normal Coronary Angiogram, non-ischemic cardiomyopathy  - Pending cardiac MRI : FH of amyloidosis   - Continue I/Os monitoring  - Keep K > 4.0 and Mg > 2.0      # Ventricular Tachycardia:  - EP consult : evaluation for life vest  - metoprolol 25 mg BID    #Asthma  -continue albuterol PRN  -Continue Symbicort  -C/w montelukast  - Sleep study out patient , may need BIPAP during sleep     Assessment:  67 yr old female Pmhx of asthma with multiple hospitalizations without intubation last 15 years ago, iron deficiency anemia. Presented to ED  with SOB found to have HFrEF and severe MR. LHC on 3/6/2023 revealed non -obstructive CAD. Patient transferred  to cardiac tele for evaluation of nonischemic cardiomyopathy. Cardia MRI with anesthesia pending.    Problems discussed and associated plan    # nonischemic cardiomyopathy  #severe MR  - Acute HFrEF  - Echo LVEF 20-25%, severe MR< mild TR, mild AR. (LVEDP 12)  - Lasix 40mg Po daily.   - c/w Metoprolol 25 m BID  (switch metoprolol tartrate to succinate on discharge)   - c/w entresto 24/26  - Cath 3/6 : Normal Coronary Angiogram, non-ischemic cardiomyopathy  - Pending cardiac MRI : FH of amyloidosis   - Continue I/Os monitoring  - Keep K > 4.0 and Mg > 2.0    # Likely LV thrombus on echo with contrast   -start Eliquis 5 mg q 12 hours.    # Ventricular Tachycardia:  - EP consult : evaluation for life vest  - metoprolol 25 mg BID    #Asthma  -continue albuterol PRN  -Continue Symbicort  -C/w montelukast  - Sleep study out patient , may need BIPAP during sleep     Assessment:  67 yr old female Pmhx of asthma with multiple hospitalizations without intubation last 15 years ago, iron deficiency anemia. Presented to ED  with SOB found to have HFrEF and severe MR. LHC on 3/6/2023 revealed non -obstructive CAD. Patient transferred  to cardiac tele for evaluation of nonischemic cardiomyopathy. Cardia MRI with anesthesia pending.    Problems discussed and associated plan    # nonischemic cardiomyopathy  #severe MR  - Acute HFrEF  - Echo LVEF 20-25%, severe MR< mild TR, mild AR. (LVEDP 12)  - Lasix 40mg Po daily.   - Increase Metoprolol 25 mg BID to 50 mg BID (switch metoprolol tartrate to succinate on discharge)   - c/w entresto 24/26  - Cath 3/6 : Normal Coronary Angiogram, non-ischemic cardiomyopathy  - Pending cardiac MRI : FH of amyloidosis   - Continue I/Os monitoring  - Keep K > 4.0 and Mg > 2.0  - F/u for labs for amyloidosis work up.    # Likely LV thrombus on echo with contrast   -start Eliquis 5 mg q 12 hours.    # Ventricular Tachycardia:  - EP consult recommended life vest on discharge  - Increase Metoprolol 25 mg BID to 50 mg BID    # hx of iron deficiency anemia  - f/u on ferritin and iron studies     #Asthma  -continue albuterol PRN  -Continue Symbicort  -C/w montelukast  - Sleep study out patient , may need BIPAP during sleep

## 2023-03-07 NOTE — PROGRESS NOTE ADULT - ASSESSMENT
IMPRESSION:    Acute hypoxemic respiratory failure - resolved   Acute asthma exacerbation - resolved   Severe persistent asthma   Pulmonary edema  HF with severely reduced EF   Obesity, likely JENNIFER/OHS    RECOMMENDATIONS:    Most recent CXR with pulmonary edema; improved   Echo noted with severely reduced EF  Cardiac cath done and is clean  Given family history of amyloid, cardiac MRI is ordered; EP consulted for lifevest   Off oxygen now; no wheezing on exam   Continue albuterol PRN; continue symbicort and montelukast  Evidence of hypercapnia on VBG; chronic and compensated  Will need outpatient sleep study; may need BIPAP during sleep; this will be determined in the sleep study  On lovenox for DVT ppx; ambulate as tolerated   Will follow as needed

## 2023-03-07 NOTE — PROGRESS NOTE ADULT - SUBJECTIVE AND OBJECTIVE BOX
Patient is a 67y old  Female who presents with a chief complaint of asthma exacerbation, pneumonia (07 Mar 2023 09:45)        Over Night Events:    On room air   Cardiac cath done and clean        ROS:  See HPI    PHYSICAL EXAM    ICU Vital Signs Last 24 Hrs  T(C): 36.8 (07 Mar 2023 00:00), Max: 36.8 (07 Mar 2023 00:00)  T(F): 98.2 (07 Mar 2023 00:00), Max: 98.2 (07 Mar 2023 00:00)  HR: 79 (07 Mar 2023 08:17) (79 - 95)  BP: 89/57 (07 Mar 2023 08:17) (86/60 - 122/65)  BP(mean): 70 (07 Mar 2023 08:17) (68 - 73)  ABP: --  ABP(mean): --  RR: 14 (07 Mar 2023 08:17) (14 - 19)  SpO2: 97% (07 Mar 2023 08:17) (97% - 98%)    O2 Parameters below as of 07 Mar 2023 08:17  Patient On (Oxygen Delivery Method): room air            General: NAD   HEENT: SIDNEY             Lymphatic system: No cervical LN   Lungs: Bilateral BS, clear   Cardiovascular: Regular   Gastrointestinal: Soft, Positive BS  Extremities: No clubbing.  Moves extremities.  Full Range of motion   Skin: Warm, intact  Neurological: No motor or sensory deficit       03-07-23 @ 07:01  -  03-07-23 @ 12:37  --------------------------------------------------------  IN:    Oral Fluid: 220 mL  Total IN: 220 mL    OUT:  Total OUT: 0 mL    Total NET: 220 mL          LABS:                            12.7   4.94  )-----------( 291      ( 07 Mar 2023 06:16 )             41.2                                               03-07    141  |  104  |  19  ----------------------------<  96  4.3   |  28  |  0.8    Ca    8.3<L>      07 Mar 2023 06:16  Mg     2.1     03-07    TPro  5.7<L>  /  Alb  3.6  /  TBili  0.3  /  DBili  x   /  AST  21  /  ALT  43<H>  /  AlkPhos  84  03-07                                                                                           LIVER FUNCTIONS - ( 07 Mar 2023 06:16 )  Alb: 3.6 g/dL / Pro: 5.7 g/dL / ALK PHOS: 84 U/L / ALT: 43 U/L / AST: 21 U/L / GGT: x                                                                                                                                       MEDICATIONS  (STANDING):  albuterol    90 MICROgram(s) HFA Inhaler 2 Puff(s) Inhalation two times a day  albuterol/ipratropium for Nebulization 3 milliLiter(s) Nebulizer every 6 hours  apixaban 5 milliGRAM(s) Oral every 12 hours  budesonide 160 MICROgram(s)/formoterol 4.5 MICROgram(s) Inhaler 2 Puff(s) Inhalation two times a day  ferrous    sulfate 325 milliGRAM(s) Oral daily  furosemide    Tablet 40 milliGRAM(s) Oral two times a day  influenza  Vaccine (HIGH DOSE) 0.7 milliLiter(s) IntraMuscular once  metoprolol tartrate 25 milliGRAM(s) Oral two times a day  montelukast 10 milliGRAM(s) Oral daily  sacubitril 24 mG/valsartan 26 mG 1 Tablet(s) Oral two times a day  sodium chloride 0.9%. 1000 milliLiter(s) (100 mL/Hr) IV Continuous <Continuous>    MEDICATIONS  (PRN):      Xrays:                                                                                     ECHO

## 2023-03-07 NOTE — CONSULT NOTE ADULT - ATTENDING COMMENTS
Patient seen / examined and plan amended above
68 yo F  with history of asthma with multiple hospitalizations without intubation, iron deficiency anemia, admitted with dyspnea on exertion (started in Feb), found to have new onset HFrEF and severe MR. Cath with nonobstructive CAD.     # NICM  # NSVT  # Suspected LV thrombus    Rec  - Check cardiac MRI  - Monitor lytes and keep K~4 and Mg~2  - Pending MRI, pt to get LifeVest on discharge  - Cont GDMT and repeat echo in 3 mo.
IMPRESSION:    Acute hypoxemic respiratory failure - resolved   Acute asthma exacerbation - resolved   Severe persistent asthma   Pulmonary edema  Obesity, likely JENNIFER/OHS    Impression and plan are my own .

## 2023-03-07 NOTE — CONSULT NOTE ADULT - SUBJECTIVE AND OBJECTIVE BOX
HPI:  67-year-old female past medical history of asthma with multiple hospitalizations without intubation, last was 15 years ago, presents with SOB. History goes back to 3 weeks ago where patient had SOB with cough, chest tightness and had to use her Symbicort and albuterol rescue inhaler more often. Patient use her Symbicort inhaler twice daily at baseline. Over the last 3 days her shortness of breath acutely worsened where she is using her albuterol inhaler every 15 to 20 minutes with no relief. Her oxygen saturation is 95% at baseline, but it decreased to 92%. Patient was picked up by EMS and given DuoNebs that made her feel better.  No nausea, vomiting, fever, chest pain, back pain, abdominal pain.  No sputum production.    In ED VS insignifcant except for , oxygen saturation 95% RA  Labs significant for VBG pH 7.31, pCO2 63, RVP negative  Imaging wet read of CXR showing RLL opacity. However, this may have been present on previous CXR  In ED, treated with azithromycin, ceftriaxone, duoneb, solumedrol 125 mg, zofran.  Admitted for asthma exacerbation and possible pneumonia   (01 Mar 2023 00:42)      ---  Cardio fellow additional notes:    Presented for shortness of breath and treated for asthma and pneumonia  Also had congestion and was diuresed     Eventually workup found an elevated BNP and an echo showed an globally decreased EF of 20% and severe MR.     Currently the patient is feeling well. She lying flat. No shortness of breath or orthopnea.   Mild LE swelling    Cardiac cath without obstructive disease   Tele showed NSVT longest of 20 beats on 3/3/2023 at 22:09      PAST MEDICAL & SURGICAL HISTORY  Asthma    HTN (hypertension)    H/O total knee replacement      FAMILY HISTORY:  FAMILY HISTORY: FH of amyloidosis       SOCIAL HISTORY:  Social History:  Smoked for a few years a pack every few days, social drinker, no drugs (01 Mar 2023 00:42)      ALLERGIES:  [This allergen will not trigger allergy alert] environmental allegies (Unknown)  No Known Drug Allergies      MEDICATIONS:  albuterol    90 MICROgram(s) HFA Inhaler 2 Puff(s) Inhalation two times a day  albuterol/ipratropium for Nebulization 3 milliLiter(s) Nebulizer every 6 hours  apixaban 5 milliGRAM(s) Oral every 12 hours  budesonide 160 MICROgram(s)/formoterol 4.5 MICROgram(s) Inhaler 2 Puff(s) Inhalation two times a day  ferrous    sulfate 325 milliGRAM(s) Oral daily  furosemide    Tablet 40 milliGRAM(s) Oral two times a day  influenza  Vaccine (HIGH DOSE) 0.7 milliLiter(s) IntraMuscular once  metoprolol tartrate 25 milliGRAM(s) Oral two times a day  montelukast 10 milliGRAM(s) Oral daily  sacubitril 24 mG/valsartan 26 mG 1 Tablet(s) Oral two times a day  sodium chloride 0.9%. 1000 milliLiter(s) (100 mL/Hr) IV Continuous <Continuous>    PRN:      HOME MEDICATIONS:  Home Medications:  Albuterol (Eqv-Ventolin HFA) 90 mcg/inh inhalation aerosol: 2 puff(s) inhaled every 6 hours (01 Mar 2023 01:35)  albuterol 0.63 mg/3 mL (0.021%) inhalation solution:  (18 Mar 2018 10:02)  ipratropium-albuterol 0.5 mg-2.5 mg/3 mL inhalation solution: 3 milliliter(s) inhaled every 6 hours (06 Mar 2023 14:14)  Singulair: orally once a day (18 Mar 2018 10:02)  Symbicort 160 mcg-4.5 mcg/inh inhalation aerosol: 2 puff(s) inhaled 2 times a day (01 Mar 2023 01:33)      VITALS:   T(F): 98.2 (03-07 @ 00:00), Max: 98.2 (03-07 @ 00:00)  HR: 79 (03-07 @ 08:17) (79 - 98)  BP: 89/57 (03-07 @ 08:17) (86/60 - 142/82)  BP(mean): 70 (03-07 @ 08:17) (68 - 73)  RR: 14 (03-07 @ 08:17) (14 - 19)  SpO2: 97% (03-07 @ 08:17) (96% - 98%)    I&O's Summary      REVIEW OF SYSTEMS:  CONSTITUTIONAL: No weakness, fevers or chills  HEENT: No visual changes, neck/ear pain  RESPIRATORY: No cough, sob  CARDIOVASCULAR: See HPI  GASTROINTESTINAL: No abdominal pain. No nausea, vomiting, diarrhea   GENITOURINARY: No dysuria, frequency or hematuria  NEUROLOGICAL: No new focal deficits  SKIN: No new rashes    PHYSICAL EXAM:  General: Not in distress.  Non-toxic appearing.   HEENT: EOMI  Cardio: regular, S1, S2, no murmur  Pulm: B/L BS.  No wheezing / crackles / rales  Abdomen: Soft, non-tender, non-distended. Normoactive bowel sounds  Extremities: No edema b/l le  Neuro: A&O x3. No focal deficits    LABS:                        12.7   4.94  )-----------( 291      ( 07 Mar 2023 06:16 )             41.2     03-07    141  |  104  |  19  ----------------------------<  96  4.3   |  28  |  0.8    Ca    8.3<L>      07 Mar 2023 06:16  Mg     2.1     03-07    TPro  5.7<L>  /  Alb  3.6  /  TBili  0.3  /  DBili  x   /  AST  21  /  ALT  43<H>  /  AlkPhos  84  03-07        COVID-19 PCR: NotDetec (04 Mar 2023 08:45)      RADIOLOGY:    TTE Echo Complete w/ Contrast w/ Doppler (03.02.23 @ 14:38) >  Summary:   1. Left ventricular ejection fraction, by visual estimation, is 20 to   25%.   2. Severely decreased global left ventricular systolic function.   3. Mildly enlarged left atrium.   4. Normal right atrial size.   5. Mild thickening of the anterior and posterior mitral valve leaflets.   6. Severe mitral valve regurgitation.   7. Mild tricuspid regurgitation.   8. Mild aortic regurgitation.   9. Endocardial visualization was enhanced with intravenous echo contrast.      -CATHETERIZATION:  FINDINGS:     Coronary Dominance: Right dominate      LM: No significant disease    LAD: No significant disease    CX: No significant disease    RCA: No significant disease    LVEDP: 12 mmHg

## 2023-03-07 NOTE — PROGRESS NOTE ADULT - SUBJECTIVE AND OBJECTIVE BOX
Chief complaint: Patient is a 67y old  Female who presents with a chief complaint of asthma exacerbation, pneumonia (07 Mar 2023 09:23)    Interval history:    Review of systems: A complete 10-point review of systems was obtained and is negative except as stated in the interval history.    Vitals:  T(F): 98.2, Max: 98.2 (03-07 @ 00:00)  HR: 79 (79 - 95)  BP: 89/57 (86/60 - 122/65)  RR: 14 (14 - 19)  SpO2: 97% (97% - 98%)    Ins & outs:     03-05 @ 07:01  -  03-06 @ 07:00  --------------------------------------------------------  IN: 440 mL / OUT: 0 mL / NET: 440 mL      Weight trend:  Weight (kg): 99.8 (03-06)    Physical exam:  General: No apparent distress  HEENT: Anicteric sclera. Moist mucous membranes. JVP *** cm.   Cardiac: Regular rate and rhythm. No murmurs, rubs, or gallops.   Vascular: Symmetric radial pulses. Dorsalis pedis pulses palpable.   Respiratory: Normal effort. Bibasilar crackles. Clear to ascultation.   Abdomen: Soft, nontender. Audible bowel sounds.   Extremities: Warm with *** edema. No cyanosis or clubbing.   Skin: Warm and dry. No rash.   Neurologic: Grossly normal motor function.   Psychiatric: Oriented to person, place, and time.     Data reviewed:  - Telemetry:   - ECG (date***):   - TTE (date***):   - Chest x-ray (date***):   - Stress test:   - CCTA:  - Cardiac catheterization:  - Cardiac MRI:    - Labs:                        12.7   4.94  )-----------( 291      ( 07 Mar 2023 06:16 )             41.2     03-07    141  |  104  |  19  ----------------------------<  96  4.3   |  28  |  0.8    Ca    8.3<L>      07 Mar 2023 06:16  Mg     2.1     03-07    TPro  5.7<L>  /  Alb  3.6  /  TBili  0.3  /  DBili  x   /  AST  21  /  ALT  43<H>  /  AlkPhos  84  03-07      Troponin T, Serum: <0.01 ng/mL (02-28-23 @ 18:48)    Serum Pro-Brain Natriuretic Peptide: 3719 pg/mL (03-02)              Medications:  albuterol    90 MICROgram(s) HFA Inhaler 2 Puff(s) Inhalation two times a day  albuterol/ipratropium for Nebulization 3 milliLiter(s) Nebulizer every 6 hours  apixaban 5 milliGRAM(s) Oral every 12 hours  budesonide 160 MICROgram(s)/formoterol 4.5 MICROgram(s) Inhaler 2 Puff(s) Inhalation two times a day  ferrous    sulfate 325 milliGRAM(s) Oral daily  furosemide    Tablet 40 milliGRAM(s) Oral two times a day  influenza  Vaccine (HIGH DOSE) 0.7 milliLiter(s) IntraMuscular once  metoprolol tartrate 25 milliGRAM(s) Oral two times a day  montelukast 10 milliGRAM(s) Oral daily  sacubitril 24 mG/valsartan 26 mG 1 Tablet(s) Oral two times a day    Drips:  sodium chloride 0.9%. 1000 milliLiter(s) (100 mL/Hr) IV Continuous <Continuous>    PRN:     Allergies    [This allergen will not trigger allergy alert] environmental allegies (Unknown)  No Known Drug Allergies    Intolerances      Assessment:      Problems discussed and associated plan:      Please contact me with any questions or concerns at [phone]. Chief complaint: Patient is a 67y old  Female who presents with a chief complaint of asthma exacerbation, pneumonia (07 Mar 2023 09:23)    Interval history:  Patient examined at bedside in AM.   Patient denies CP, SOB and palpitations  one 5 nsvt episode on tele overnight  Cardiac MRI with anesthesia reschedule for tomorrow.       Review of systems: A complete 10-point review of systems was obtained and is negative except as stated in the interval history.    Vitals:  T(F): 98.2, Max: 98.2 (03-07 @ 00:00)  HR: 79 (79 - 95)  BP: 89/57 (86/60 - 122/65)  RR: 14 (14 - 19)  SpO2: 97% (97% - 98%)    Ins & outs:     03-05 @ 07:01  -  03-06 @ 07:00  --------------------------------------------------------  IN: 440 mL / OUT: 0 mL / NET: 440 mL      Weight trend:  Weight (kg): 99.8 (03-06)    Physical exam:  GENERAL: NAD, lying in bed comfortably  HEAD:  Atraumatic, normocephalic  EYES: EOMI, PERRLA, conjunctiva and sclera clear  ENT: Moist mucous membranes  NECK: Supple, no JVD  HEART: Regular rate and rhythm, no murmurs, rubs, or gallops  LUNGS: Unlabored respirations.  Clear to auscultation bilaterally, no crackles, wheezing, or rhonchi  ABDOMEN: Soft, nontender, nondistended, +BS  EXTREMITIES: 2+ peripheral pulses bilaterally. No clubbing, cyanosis, or edema  NERVOUS SYSTEM:  A&Ox3, no focal deficits   SKIN: No rashes or lesions    Data reviewed:  - Telemetry: RS 79-91  - ECG < from: 12 Lead ECG (02.28.23 @ 18:48) >  Ventricular Rate 103 BPM    Atrial Rate 103 BPM    P-R Interval 152 ms    QRS Duration 98 ms    Q-T Interval 366 ms    QTC Calculation(Bazett) 479 ms    P Axis 70 degrees    R Axis 37 degrees    T Axis 75 degrees    Diagnosis Line Sinus tachycardiawith occasional Premature ventricular complexes  Nonspecific T wave abnormality  Abnormal ECG       - TTE   < from: TTE Echo Complete w/ Contrast w/ Doppler (03.02.23 @ 14:38) >  Summary:   1. Left ventricular ejection fraction, by visual estimation, is 20 to   25%.   2. Severely decreased global left ventricular systolic function.   3. Mildly enlarged left atrium.   4. Normal right atrial size.   5. Mild thickening of the anterior and posterior mitral valve leaflets.   6. Severe mitral valve regurgitation.   7. Mild tricuspid regurgitation.   8. Mild aortic regurgitation.   9. Endocardial visualization was enhanced with intravenous echo contrast.    - Chest x-ray < from: Xray Chest 1 View- PORTABLE-Routine (Xray Chest 1 View- PORTABLE-Routine in AM.) (03.03.23 @ 06:58) >  Impression:  Unchanged pulmonary vascular congestion.    - Cardiac catheterization: 3/6/2023  FINDINGS:   Coronary Dominance: Right dominate    LM: No significant disease    LAD: No significant disease    CX: No significant disease    RCA: No significant disease     LVEDP: 12 mmHg     ESTIMATED BLOOD LOSS: < 10 mL      POST-OP DIAGNOSIS:      [X] Normal Coronary Angiogram, non-ischemic cardiomyopathy    - Labs:                        12.7   4.94  )-----------( 291      ( 07 Mar 2023 06:16 )             41.2     03-07    141  |  104  |  19  ----------------------------<  96  4.3   |  28  |  0.8    Ca    8.3<L>      07 Mar 2023 06:16  Mg     2.1     03-07    TPro  5.7<L>  /  Alb  3.6  /  TBili  0.3  /  DBili  x   /  AST  21  /  ALT  43<H>  /  AlkPhos  84  03-07      Troponin T, Serum: <0.01 ng/mL (02-28-23 @ 18:48)    Serum Pro-Brain Natriuretic Peptide: 3719 pg/mL (03-02)        Medications:  albuterol    90 MICROgram(s) HFA Inhaler 2 Puff(s) Inhalation two times a day  albuterol/ipratropium for Nebulization 3 milliLiter(s) Nebulizer every 6 hours  apixaban 5 milliGRAM(s) Oral every 12 hours  budesonide 160 MICROgram(s)/formoterol 4.5 MICROgram(s) Inhaler 2 Puff(s) Inhalation two times a day  ferrous    sulfate 325 milliGRAM(s) Oral daily  furosemide    Tablet 40 milliGRAM(s) Oral two times a day  influenza  Vaccine (HIGH DOSE) 0.7 milliLiter(s) IntraMuscular once  metoprolol tartrate 25 milliGRAM(s) Oral two times a day  montelukast 10 milliGRAM(s) Oral daily  sacubitril 24 mG/valsartan 26 mG 1 Tablet(s) Oral two times a day    Drips:  sodium chloride 0.9%. 1000 milliLiter(s) (100 mL/Hr) IV Continuous <Continuous>    PRN:     Allergies    [This allergen will not trigger allergy alert] environmental allegies (Unknown)  No Known Drug Allergies    Intolerances   Chief complaint: Patient is a 67y old  Female who presents with a chief complaint of asthma exacerbation, pneumonia (07 Mar 2023 09:23)    Interval history:  Patient examined at bedside in AM.   Patient denies CP, SOB and palpitations  one 5 nsvt episode on tele overnight  Cardiac MRI with anesthesia reschedule for tomorrow.       Review of systems: A complete 10-point review of systems was obtained and is negative except as stated in the interval history.    Vitals:  T(F): 98.2, Max: 98.2 (03-07 @ 00:00)  HR: 79 (79 - 95)  BP: 89/57 (86/60 - 122/65)  RR: 14 (14 - 19)  SpO2: 97% (97% - 98%)    Ins & outs:     03-05 @ 07:01  -  03-06 @ 07:00  --------------------------------------------------------  IN: 440 mL / OUT: 0 mL / NET: 440 mL      Weight trend:  Weight (kg): 99.8 (03-06)    Physical exam:  GENERAL: NAD, lying in bed comfortably  HEAD:  Atraumatic, normocephalic  EYES: EOMI, PERRLA, conjunctiva and sclera clear  ENT: Moist mucous membranes  NECK: Supple, no JVD  HEART: Regular rate and rhythm, Murmur present, no rubs, or gallops  LUNGS: Unlabored respirations.  Clear to auscultation bilaterally, no crackles, wheezing, or rhonchi  ABDOMEN: Soft, nontender, nondistended, +BS  EXTREMITIES: 2+ peripheral pulses bilaterally. No clubbing, cyanosis, or edema  NERVOUS SYSTEM:  A&Ox3, no focal deficits   SKIN: No rashes or lesions    Data reviewed:  - Telemetry: RS 79-91  - ECG < from: 12 Lead ECG (02.28.23 @ 18:48) >  Ventricular Rate 103 BPM    Atrial Rate 103 BPM    P-R Interval 152 ms    QRS Duration 98 ms    Q-T Interval 366 ms    QTC Calculation(Bazett) 479 ms    P Axis 70 degrees    R Axis 37 degrees    T Axis 75 degrees    Diagnosis Line Sinus tachycardiawith occasional Premature ventricular complexes  Nonspecific T wave abnormality  Abnormal ECG       - TTE   < from: TTE Echo Complete w/ Contrast w/ Doppler (03.02.23 @ 14:38) >  Summary:   1. Left ventricular ejection fraction, by visual estimation, is 20 to   25%.   2. Severely decreased global left ventricular systolic function.   3. Mildly enlarged left atrium.   4. Normal right atrial size.   5. Mild thickening of the anterior and posterior mitral valve leaflets.   6. Severe mitral valve regurgitation.   7. Mild tricuspid regurgitation.   8. Mild aortic regurgitation.   9. Endocardial visualization was enhanced with intravenous echo contrast.    - Chest x-ray < from: Xray Chest 1 View- PORTABLE-Routine (Xray Chest 1 View- PORTABLE-Routine in AM.) (03.03.23 @ 06:58) >  Impression:  Unchanged pulmonary vascular congestion.    - Cardiac catheterization: 3/6/2023  FINDINGS:   Coronary Dominance: Right dominate    LM: No significant disease    LAD: No significant disease    CX: No significant disease    RCA: No significant disease     LVEDP: 12 mmHg     ESTIMATED BLOOD LOSS: < 10 mL      POST-OP DIAGNOSIS:      [X] Normal Coronary Angiogram, non-ischemic cardiomyopathy    - Labs:                        12.7   4.94  )-----------( 291      ( 07 Mar 2023 06:16 )             41.2     03-07    141  |  104  |  19  ----------------------------<  96  4.3   |  28  |  0.8    Ca    8.3<L>      07 Mar 2023 06:16  Mg     2.1     03-07    TPro  5.7<L>  /  Alb  3.6  /  TBili  0.3  /  DBili  x   /  AST  21  /  ALT  43<H>  /  AlkPhos  84  03-07      Troponin T, Serum: <0.01 ng/mL (02-28-23 @ 18:48)    Serum Pro-Brain Natriuretic Peptide: 3719 pg/mL (03-02)        Medications:  albuterol    90 MICROgram(s) HFA Inhaler 2 Puff(s) Inhalation two times a day  albuterol/ipratropium for Nebulization 3 milliLiter(s) Nebulizer every 6 hours  apixaban 5 milliGRAM(s) Oral every 12 hours  budesonide 160 MICROgram(s)/formoterol 4.5 MICROgram(s) Inhaler 2 Puff(s) Inhalation two times a day  ferrous    sulfate 325 milliGRAM(s) Oral daily  furosemide    Tablet 40 milliGRAM(s) Oral two times a day  influenza  Vaccine (HIGH DOSE) 0.7 milliLiter(s) IntraMuscular once  metoprolol tartrate 25 milliGRAM(s) Oral two times a day  montelukast 10 milliGRAM(s) Oral daily  sacubitril 24 mG/valsartan 26 mG 1 Tablet(s) Oral two times a day    Drips:  sodium chloride 0.9%. 1000 milliLiter(s) (100 mL/Hr) IV Continuous <Continuous>    PRN:     Allergies    [This allergen will not trigger allergy alert] environmental allegies (Unknown)  No Known Drug Allergies    Intolerances   Chief complaint: Patient is a 67y old  Female who presents with a chief complaint of asthma exacerbation, pneumonia (07 Mar 2023 09:23)    Interval history:  Patient examined at bedside in AM.   Patient denies CP, SOB and palpitations  one 5 nsvt episode on tele overnight  Cardiac MRI with anesthesia reschedule for tomorrow.       Review of systems: A complete 10-point review of systems was obtained and is negative except as stated in the interval history.    Vitals:  T(F): 98.2, Max: 98.2 (03-07 @ 00:00)  HR: 79 (79 - 95)  BP: 89/57 (86/60 - 122/65)  RR: 14 (14 - 19)  SpO2: 97% (97% - 98%)    Ins & outs:     03-05 @ 07:01  -  03-06 @ 07:00  --------------------------------------------------------  IN: 440 mL / OUT: 0 mL / NET: 440 mL      Weight trend:  Weight (kg): 99.8 (03-06)    Physical exam:  GENERAL: NAD, lying in bed comfortably  HEAD:  Atraumatic, normocephalic  EYES: EOMI, PERRLA, conjunctiva and sclera clear  ENT: Moist mucous membranes  NECK: Supple, no JVD  HEART: Regular rate and rhythm, Murmur present, no rubs, or gallops  LUNGS: Unlabored respirations.  Clear to auscultation bilaterally, no crackles, wheezing, or rhonchi  ABDOMEN: Soft, nontender, nondistended, +BS  EXTREMITIES: 2+ peripheral pulses bilaterally. No clubbing, cyanosis, or edema  NERVOUS SYSTEM:  A&Ox3, no focal deficits   SKIN: No rashes or lesions    Data reviewed:  - Telemetry:  5 b nsvt  HR 79-91  - ECG < from: 12 Lead ECG (02.28.23 @ 18:48) >  Ventricular Rate 103 BPM    Atrial Rate 103 BPM    P-R Interval 152 ms    QRS Duration 98 ms    Q-T Interval 366 ms    QTC Calculation(Bazett) 479 ms    P Axis 70 degrees    R Axis 37 degrees    T Axis 75 degrees    Diagnosis Line Sinus tachycardiawith occasional Premature ventricular complexes  Nonspecific T wave abnormality  Abnormal ECG       - TTE   < from: TTE Echo Complete w/ Contrast w/ Doppler (03.02.23 @ 14:38) >  Summary:   1. Left ventricular ejection fraction, by visual estimation, is 20 to   25%.   2. Severely decreased global left ventricular systolic function.   3. Mildly enlarged left atrium.   4. Normal right atrial size.   5. Mild thickening of the anterior and posterior mitral valve leaflets.   6. Severe mitral valve regurgitation.   7. Mild tricuspid regurgitation.   8. Mild aortic regurgitation.   9. Endocardial visualization was enhanced with intravenous echo contrast.    - Chest x-ray < from: Xray Chest 1 View- PORTABLE-Routine (Xray Chest 1 View- PORTABLE-Routine in AM.) (03.03.23 @ 06:58) >  Impression:  Unchanged pulmonary vascular congestion.    - Cardiac catheterization: 3/6/2023  FINDINGS:   Coronary Dominance: Right dominate    LM: No significant disease    LAD: No significant disease    CX: No significant disease    RCA: No significant disease     LVEDP: 12 mmHg     ESTIMATED BLOOD LOSS: < 10 mL      POST-OP DIAGNOSIS:      [X] Normal Coronary Angiogram, non-ischemic cardiomyopathy    - Labs:                        12.7   4.94  )-----------( 291      ( 07 Mar 2023 06:16 )             41.2     03-07    141  |  104  |  19  ----------------------------<  96  4.3   |  28  |  0.8    Ca    8.3<L>      07 Mar 2023 06:16  Mg     2.1     03-07    TPro  5.7<L>  /  Alb  3.6  /  TBili  0.3  /  DBili  x   /  AST  21  /  ALT  43<H>  /  AlkPhos  84  03-07      Troponin T, Serum: <0.01 ng/mL (02-28-23 @ 18:48)    Serum Pro-Brain Natriuretic Peptide: 3719 pg/mL (03-02)        Medications:  albuterol    90 MICROgram(s) HFA Inhaler 2 Puff(s) Inhalation two times a day  albuterol/ipratropium for Nebulization 3 milliLiter(s) Nebulizer every 6 hours  apixaban 5 milliGRAM(s) Oral every 12 hours  budesonide 160 MICROgram(s)/formoterol 4.5 MICROgram(s) Inhaler 2 Puff(s) Inhalation two times a day  ferrous    sulfate 325 milliGRAM(s) Oral daily  furosemide    Tablet 40 milliGRAM(s) Oral two times a day  influenza  Vaccine (HIGH DOSE) 0.7 milliLiter(s) IntraMuscular once  metoprolol tartrate 25 milliGRAM(s) Oral two times a day  montelukast 10 milliGRAM(s) Oral daily  sacubitril 24 mG/valsartan 26 mG 1 Tablet(s) Oral two times a day    Drips:  sodium chloride 0.9%. 1000 milliLiter(s) (100 mL/Hr) IV Continuous <Continuous>    PRN:     Allergies    [This allergen will not trigger allergy alert] environmental allegies (Unknown)  No Known Drug Allergies    Intolerances

## 2023-03-07 NOTE — CONSULT NOTE ADULT - ASSESSMENT
67-year-old female past medical history of asthma with multiple hospitalizations without intubation, last was 15 years ago, iron deficiency anemia presents with SOB found to have HFrEF and severe MR    EP consulted for [facility]     # NICM  # Non sustained V-tach  # Acute Decompensated HFrEF - resolved  # Asthma  - euvolemic on exam   - tele showing multiple episode of NSVT  - BNP: 3700  - Echo LVEF 20-25%, severe MR  - CXR showed bilateral interstitial infiltrates  - Cath 3/6 : Normal Coronary Angiogram, non-ischemic cardiomyopathy  - c/w Lasix 40mg Po daily  - c/w Metoprolol and Entresto   - cardiac MRI pending   - will arrange Life[facility]    Attending to see pt.        ***************INCOMPLETE NOTE*********************      67-year-old female past medical history of asthma with multiple hospitalizations without intubation, last was 15 years ago, iron deficiency anemia presents with SOB found to have HFrEF and severe MR    EP consulted for Life-V[facility]     # NICM  # Non sustained V-tach  # Acute Decompensated HFrEF - resolved  # Asthma  - euvolemic on exam   - tele showing multiple episode of NSVT  - BNP: 3700  - Echo LVEF 20-25%, severe MR  - CXR showed bilateral interstitial infiltrates  - Cath 3/6 : Normal Coronary Angiogram, non-ischemic cardiomyopathy  - c/w Lasix 40mg Po daily  - c/w Metoprolol and Entresto   - cardiac MRI pending   - will arrange [facility]    Attending to see pt.  67-year-old female past medical history of asthma with multiple hospitalizations without intubation, last was 15 years ago, iron deficiency anemia presents with SOB found to have HFrEF and severe MR    EP consulted for Life-V[facility]     # NICM  # Non sustained V-tach  # Acute Decompensated HFrEF - resolved  # Asthma  - euvolemic on exam   - tele showing 4 beats of NSVT  - BNP: 3700  - Echo LVEF 20-25%, severe MR  - CXR showed bilateral interstitial infiltrates  - Cath 3/6 : Normal Coronary Angiogram, non-ischemic cardiomyopathy  - c/w Lasix 40mg Po daily  - c/w Metoprolol   - entresto held today due to low blood pressure   - pt on Eliquis for suspected LV thrombus   - cardiac MRI pending       Attending to see pt.  67-year-old female past medical history of asthma with multiple hospitalizations without intubation, last was 15 years ago, iron deficiency anemia presents with SOB found to have HFrEF and severe MR    EP consulted for Life-V[facility]     # NICM  # Non sustained V-tach  # Acute Decompensated HFrEF - resolved  # Asthma  - euvolemic on exam   - tele showing 4 beats of NSVT  - BNP: 3700  - Echo LVEF 20-25%, severe MR  - CXR showed bilateral interstitial infiltrates  - Cath 3/6 : Normal Coronary Angiogram, non-ischemic cardiomyopathy  - c/w Lasix 40mg Po daily  - c/w Metoprolol   - entresto held today due to low blood pressure   - pt on Eliquis for suspected LV thrombus   - cardiac MRI pending   - LifeVest depending on MR result

## 2023-03-08 LAB
ANION GAP SERPL CALC-SCNC: 8 MMOL/L — SIGNIFICANT CHANGE UP (ref 7–14)
APPEARANCE UR: CLEAR — SIGNIFICANT CHANGE UP
BACTERIA # UR AUTO: NEGATIVE — SIGNIFICANT CHANGE UP
BILIRUB UR-MCNC: NEGATIVE — SIGNIFICANT CHANGE UP
BUN SERPL-MCNC: 21 MG/DL — HIGH (ref 10–20)
CALCIUM SERPL-MCNC: 8.7 MG/DL — SIGNIFICANT CHANGE UP (ref 8.4–10.5)
CHLORIDE SERPL-SCNC: 105 MMOL/L — SIGNIFICANT CHANGE UP (ref 98–110)
CO2 SERPL-SCNC: 27 MMOL/L — SIGNIFICANT CHANGE UP (ref 17–32)
COLOR SPEC: YELLOW — SIGNIFICANT CHANGE UP
CREAT SERPL-MCNC: 0.8 MG/DL — SIGNIFICANT CHANGE UP (ref 0.7–1.5)
D DIMER BLD IA.RAPID-MCNC: 334 NG/ML DDU — HIGH
DIFF PNL FLD: ABNORMAL
EGFR: 82 ML/MIN/1.73M2 — SIGNIFICANT CHANGE UP
EPI CELLS # UR: 1 /HPF — SIGNIFICANT CHANGE UP (ref 0–5)
FERRITIN SERPL-MCNC: 42 NG/ML — SIGNIFICANT CHANGE UP (ref 15–150)
GLUCOSE SERPL-MCNC: 106 MG/DL — HIGH (ref 70–99)
GLUCOSE UR QL: NEGATIVE — SIGNIFICANT CHANGE UP
HCT VFR BLD CALC: 38.7 % — SIGNIFICANT CHANGE UP (ref 37–47)
HGB BLD-MCNC: 11.9 G/DL — LOW (ref 12–16)
HYALINE CASTS # UR AUTO: 0 /LPF — SIGNIFICANT CHANGE UP (ref 0–7)
KETONES UR-MCNC: NEGATIVE — SIGNIFICANT CHANGE UP
LACTATE SERPL-SCNC: 1.6 MMOL/L — SIGNIFICANT CHANGE UP (ref 0.7–2)
LEUKOCYTE ESTERASE UR-ACNC: ABNORMAL
MAGNESIUM SERPL-MCNC: 2.2 MG/DL — SIGNIFICANT CHANGE UP (ref 1.8–2.4)
MCHC RBC-ENTMCNC: 27.7 PG — SIGNIFICANT CHANGE UP (ref 27–31)
MCHC RBC-ENTMCNC: 30.7 G/DL — LOW (ref 32–37)
MCV RBC AUTO: 90.2 FL — SIGNIFICANT CHANGE UP (ref 81–99)
NITRITE UR-MCNC: NEGATIVE — SIGNIFICANT CHANGE UP
NRBC # BLD: 0 /100 WBCS — SIGNIFICANT CHANGE UP (ref 0–0)
PH UR: 6 — SIGNIFICANT CHANGE UP (ref 5–8)
PLATELET # BLD AUTO: 257 K/UL — SIGNIFICANT CHANGE UP (ref 130–400)
POTASSIUM SERPL-MCNC: 4.8 MMOL/L — SIGNIFICANT CHANGE UP (ref 3.5–5)
POTASSIUM SERPL-SCNC: 4.8 MMOL/L — SIGNIFICANT CHANGE UP (ref 3.5–5)
PROT UR-MCNC: SIGNIFICANT CHANGE UP
RBC # BLD: 4.29 M/UL — SIGNIFICANT CHANGE UP (ref 4.2–5.4)
RBC # FLD: 13.3 % — SIGNIFICANT CHANGE UP (ref 11.5–14.5)
RBC CASTS # UR COMP ASSIST: 3 /HPF — SIGNIFICANT CHANGE UP (ref 0–4)
SODIUM SERPL-SCNC: 140 MMOL/L — SIGNIFICANT CHANGE UP (ref 135–146)
SP GR SPEC: 1.02 — SIGNIFICANT CHANGE UP (ref 1.01–1.03)
UROBILINOGEN FLD QL: SIGNIFICANT CHANGE UP
WBC # BLD: 4.39 K/UL — LOW (ref 4.8–10.8)
WBC # FLD AUTO: 4.39 K/UL — LOW (ref 4.8–10.8)
WBC UR QL: 3 /HPF — SIGNIFICANT CHANGE UP (ref 0–5)

## 2023-03-08 PROCEDURE — 99233 SBSQ HOSP IP/OBS HIGH 50: CPT

## 2023-03-08 PROCEDURE — 71045 X-RAY EXAM CHEST 1 VIEW: CPT | Mod: 26

## 2023-03-08 RX ORDER — CEFEPIME 1 G/1
INJECTION, POWDER, FOR SOLUTION INTRAMUSCULAR; INTRAVENOUS
Refills: 0 | Status: DISCONTINUED | OUTPATIENT
Start: 2023-03-08 | End: 2023-03-09

## 2023-03-08 RX ORDER — CEFEPIME 1 G/1
1000 INJECTION, POWDER, FOR SOLUTION INTRAMUSCULAR; INTRAVENOUS ONCE
Refills: 0 | Status: COMPLETED | OUTPATIENT
Start: 2023-03-08 | End: 2023-03-08

## 2023-03-08 RX ORDER — LANOLIN ALCOHOL/MO/W.PET/CERES
3 CREAM (GRAM) TOPICAL AT BEDTIME
Refills: 0 | Status: DISCONTINUED | OUTPATIENT
Start: 2023-03-08 | End: 2023-03-11

## 2023-03-08 RX ORDER — CHLORHEXIDINE GLUCONATE 213 G/1000ML
1 SOLUTION TOPICAL DAILY
Refills: 0 | Status: DISCONTINUED | OUTPATIENT
Start: 2023-03-08 | End: 2023-03-11

## 2023-03-08 RX ORDER — SACUBITRIL AND VALSARTAN 24; 26 MG/1; MG/1
0.5 TABLET, FILM COATED ORAL
Refills: 0 | Status: DISCONTINUED | OUTPATIENT
Start: 2023-03-08 | End: 2023-03-09

## 2023-03-08 RX ORDER — VANCOMYCIN HCL 1 G
1500 VIAL (EA) INTRAVENOUS ONCE
Refills: 0 | Status: COMPLETED | OUTPATIENT
Start: 2023-03-08 | End: 2023-03-08

## 2023-03-08 RX ORDER — ACETAMINOPHEN 500 MG
650 TABLET ORAL EVERY 6 HOURS
Refills: 0 | Status: DISCONTINUED | OUTPATIENT
Start: 2023-03-08 | End: 2023-03-11

## 2023-03-08 RX ORDER — CEFEPIME 1 G/1
1000 INJECTION, POWDER, FOR SOLUTION INTRAMUSCULAR; INTRAVENOUS EVERY 12 HOURS
Refills: 0 | Status: DISCONTINUED | OUTPATIENT
Start: 2023-03-09 | End: 2023-03-09

## 2023-03-08 RX ORDER — FUROSEMIDE 40 MG
40 TABLET ORAL ONCE
Refills: 0 | Status: COMPLETED | OUTPATIENT
Start: 2023-03-08 | End: 2023-03-08

## 2023-03-08 RX ORDER — IRON SUCROSE 20 MG/ML
200 INJECTION, SOLUTION INTRAVENOUS EVERY 24 HOURS
Refills: 0 | Status: DISCONTINUED | OUTPATIENT
Start: 2023-03-08 | End: 2023-03-11

## 2023-03-08 RX ADMIN — SACUBITRIL AND VALSARTAN 1 TABLET(S): 24; 26 TABLET, FILM COATED ORAL at 05:52

## 2023-03-08 RX ADMIN — Medication 50 MILLIGRAM(S): at 05:52

## 2023-03-08 RX ADMIN — Medication 3 MILLILITER(S): at 08:51

## 2023-03-08 RX ADMIN — CHLORHEXIDINE GLUCONATE 1 APPLICATION(S): 213 SOLUTION TOPICAL at 12:16

## 2023-03-08 RX ADMIN — Medication 40 MILLIGRAM(S): at 21:47

## 2023-03-08 RX ADMIN — CEFEPIME 100 MILLIGRAM(S): 1 INJECTION, POWDER, FOR SOLUTION INTRAMUSCULAR; INTRAVENOUS at 22:17

## 2023-03-08 RX ADMIN — SACUBITRIL AND VALSARTAN 0.5 TABLET(S): 24; 26 TABLET, FILM COATED ORAL at 18:38

## 2023-03-08 RX ADMIN — Medication 325 MILLIGRAM(S): at 12:15

## 2023-03-08 RX ADMIN — Medication 300 MILLIGRAM(S): at 22:18

## 2023-03-08 RX ADMIN — Medication 50 MILLIGRAM(S): at 18:37

## 2023-03-08 RX ADMIN — Medication 3 MILLILITER(S): at 21:04

## 2023-03-08 RX ADMIN — IRON SUCROSE 110 MILLIGRAM(S): 20 INJECTION, SOLUTION INTRAVENOUS at 12:15

## 2023-03-08 RX ADMIN — APIXABAN 5 MILLIGRAM(S): 2.5 TABLET, FILM COATED ORAL at 05:52

## 2023-03-08 RX ADMIN — MONTELUKAST 10 MILLIGRAM(S): 4 TABLET, CHEWABLE ORAL at 12:15

## 2023-03-08 RX ADMIN — Medication 40 MILLIGRAM(S): at 05:52

## 2023-03-08 RX ADMIN — BUDESONIDE AND FORMOTEROL FUMARATE DIHYDRATE 2 PUFF(S): 160; 4.5 AEROSOL RESPIRATORY (INHALATION) at 09:03

## 2023-03-08 RX ADMIN — Medication 3 MILLILITER(S): at 13:37

## 2023-03-08 RX ADMIN — APIXABAN 5 MILLIGRAM(S): 2.5 TABLET, FILM COATED ORAL at 18:37

## 2023-03-08 NOTE — PROGRESS NOTE ADULT - SUBJECTIVE AND OBJECTIVE BOX
Chief complaint: Patient is a 67y old  Female who presents with a chief complaint of asthma exacerbation, pneumonia (07 Mar 2023 09:23)    Interval history:  Patient examined at bedside in AM.   Patient denies CP, SOB and palpitations  one 5 nsvt episode on tele overnight  Cardiac MRI with anesthesia reschedule for tomorrow.       Review of systems: A complete 10-point review of systems was obtained and is negative except as stated in the interval history.    Vitals:  T(F): 98.2, Max: 98.2 (03-07 @ 00:00)  HR: 79 (79 - 95)  BP: 89/57 (86/60 - 122/65)  RR: 14 (14 - 19)  SpO2: 97% (97% - 98%)    Ins & outs:     03-05 @ 07:01  -  03-06 @ 07:00  --------------------------------------------------------  IN: 440 mL / OUT: 0 mL / NET: 440 mL      Weight trend:  Weight (kg): 99.8 (03-06)    Physical exam:  GENERAL: NAD, lying in bed comfortably  HEAD:  Atraumatic, normocephalic  EYES: EOMI, PERRLA, conjunctiva and sclera clear  ENT: Moist mucous membranes  NECK: Supple, no JVD  HEART: Regular rate and rhythm, Murmur present, no rubs, or gallops  LUNGS: Unlabored respirations.  Clear to auscultation bilaterally, no crackles, wheezing, or rhonchi  ABDOMEN: Soft, nontender, nondistended, +BS  EXTREMITIES: 2+ peripheral pulses bilaterally. No clubbing, cyanosis, or edema  NERVOUS SYSTEM:  A&Ox3, no focal deficits   SKIN: No rashes or lesions    Data reviewed:  - Telemetry:  5 b nsvt  HR 79-91  - ECG < from: 12 Lead ECG (02.28.23 @ 18:48) >  Ventricular Rate 103 BPM    Atrial Rate 103 BPM    P-R Interval 152 ms    QRS Duration 98 ms    Q-T Interval 366 ms    QTC Calculation(Bazett) 479 ms    P Axis 70 degrees    R Axis 37 degrees    T Axis 75 degrees    Diagnosis Line Sinus tachycardiawith occasional Premature ventricular complexes  Nonspecific T wave abnormality  Abnormal ECG       - TTE   < from: TTE Echo Complete w/ Contrast w/ Doppler (03.02.23 @ 14:38) >  Summary:   1. Left ventricular ejection fraction, by visual estimation, is 20 to   25%.   2. Severely decreased global left ventricular systolic function.   3. Mildly enlarged left atrium.   4. Normal right atrial size.   5. Mild thickening of the anterior and posterior mitral valve leaflets.   6. Severe mitral valve regurgitation.   7. Mild tricuspid regurgitation.   8. Mild aortic regurgitation.   9. Endocardial visualization was enhanced with intravenous echo contrast.    - Chest x-ray < from: Xray Chest 1 View- PORTABLE-Routine (Xray Chest 1 View- PORTABLE-Routine in AM.) (03.03.23 @ 06:58) >  Impression:  Unchanged pulmonary vascular congestion.    - Cardiac catheterization: 3/6/2023  FINDINGS:   Coronary Dominance: Right dominate    LM: No significant disease    LAD: No significant disease    CX: No significant disease    RCA: No significant disease     LVEDP: 12 mmHg     ESTIMATED BLOOD LOSS: < 10 mL      POST-OP DIAGNOSIS:      [X] Normal Coronary Angiogram, non-ischemic cardiomyopathy    - Labs:                        12.7   4.94  )-----------( 291      ( 07 Mar 2023 06:16 )             41.2     03-07    141  |  104  |  19  ----------------------------<  96  4.3   |  28  |  0.8    Ca    8.3<L>      07 Mar 2023 06:16  Mg     2.1     03-07    TPro  5.7<L>  /  Alb  3.6  /  TBili  0.3  /  DBili  x   /  AST  21  /  ALT  43<H>  /  AlkPhos  84  03-07      Troponin T, Serum: <0.01 ng/mL (02-28-23 @ 18:48)    Serum Pro-Brain Natriuretic Peptide: 3719 pg/mL (03-02)        Medications:  albuterol    90 MICROgram(s) HFA Inhaler 2 Puff(s) Inhalation two times a day  albuterol/ipratropium for Nebulization 3 milliLiter(s) Nebulizer every 6 hours  apixaban 5 milliGRAM(s) Oral every 12 hours  budesonide 160 MICROgram(s)/formoterol 4.5 MICROgram(s) Inhaler 2 Puff(s) Inhalation two times a day  ferrous    sulfate 325 milliGRAM(s) Oral daily  furosemide    Tablet 40 milliGRAM(s) Oral two times a day  influenza  Vaccine (HIGH DOSE) 0.7 milliLiter(s) IntraMuscular once  metoprolol tartrate 25 milliGRAM(s) Oral two times a day  montelukast 10 milliGRAM(s) Oral daily  sacubitril 24 mG/valsartan 26 mG 1 Tablet(s) Oral two times a day    Drips:  sodium chloride 0.9%. 1000 milliLiter(s) (100 mL/Hr) IV Continuous <Continuous>    PRN:     Allergies    [This allergen will not trigger allergy alert] environmental allegies (Unknown)  No Known Drug Allergies    Intolerances   Chief complaint: Patient is a 67y old  Female who presents with a chief complaint of asthma exacerbation, pneumonia (07 Mar 2023 09:23)    Interval history:  Patient examined at bedside in AM.   Patient denies CP, SOB and palpitations  one 5 nsvt episode on tele overnight  Cardiac MRI with anesthesia reschedule for tomorrow.       Review of systems: A complete 10-point review of systems was obtained and is negative except as stated in the interval history.    Vitals:  T(F): 97.3 (08 Mar 2023 07:48), Max: 97.9 (07 Mar 2023 15:34)  HR: 83 (08 Mar 2023 07:48) (83 - 929)  BP: 81/51 (08 Mar 2023 07:48) (81/51 - 101/72)  RR: 18 (08 Mar 2023 07:48) (16 - 18)  SpO2: 99% (08 Mar 2023 00:00) (95% - 99%)        03-05 @ 07:01  -  03-06 @ 07:00  --------------------------------------------------------  IN: 440 mL / OUT: 0 mL / NET: 440 mL    07 Mar 2023 07:01  -  08 Mar 2023 07:00  --------------------------------------------------------  IN: 440 mL / OUT: 0 mL / NET: 440 mL        Weight trend:  Weight (kg): 99.8 (03-06)    Physical exam:  GENERAL: NAD, lying in bed comfortably  HEAD:  Atraumatic, normocephalic  EYES: EOMI, PERRLA, conjunctiva and sclera clear  ENT: Moist mucous membranes  NECK: Supple, no JVD  HEART: Regular rate and rhythm, Murmur present, no rubs, or gallops  LUNGS: Unlabored respirations.  Clear to auscultation bilaterally, no crackles, wheezing, or rhonchi  ABDOMEN: Soft, nontender, nondistended, +BS  EXTREMITIES: 2+ peripheral pulses bilaterally. No clubbing, cyanosis, or edema  NERVOUS SYSTEM:  A&Ox3, no focal deficits   SKIN: No rashes or lesions    Data reviewed:  - Telemetry:  5 b nsvt  HR   - ECG < from: 12 Lead ECG (02.28.23 @ 18:48) >  Ventricular Rate 103 BPM    Atrial Rate 103 BPM    P-R Interval 152 ms    QRS Duration 98 ms    Q-T Interval 366 ms    QTC Calculation(Bazett) 479 ms    P Axis 70 degrees    R Axis 37 degrees    T Axis 75 degrees    Diagnosis Line Sinus tachycardiawith occasional Premature ventricular complexes  Nonspecific T wave abnormality  Abnormal ECG       - TTE   < from: TTE Echo Complete w/ Contrast w/ Doppler (03.02.23 @ 14:38) >  Summary:   1. Left ventricular ejection fraction, by visual estimation, is 20 to   25%.   2. Severely decreased global left ventricular systolic function.   3. Mildly enlarged left atrium.   4. Normal right atrial size.   5. Mild thickening of the anterior and posterior mitral valve leaflets.   6. Severe mitral valve regurgitation.   7. Mild tricuspid regurgitation.   8. Mild aortic regurgitation.   9. Endocardial visualization was enhanced with intravenous echo contrast.    - Chest x-ray < from: Xray Chest 1 View- PORTABLE-Routine (Xray Chest 1 View- PORTABLE-Routine in AM.) (03.03.23 @ 06:58) >  Impression:  Unchanged pulmonary vascular congestion.    - Cardiac catheterization: 3/6/2023  FINDINGS:   Coronary Dominance: Right dominate    LM: No significant disease    LAD: No significant disease    CX: No significant disease    RCA: No significant disease     LVEDP: 12 mmHg     ESTIMATED BLOOD LOSS: < 10 mL      POST-OP DIAGNOSIS:      [X] Normal Coronary Angiogram, non-ischemic cardiomyopathy    - Labs:                        11.9   4.39  )-----------( 257      ( 08 Mar 2023 06:46 )             38.7     03-08    140  |  105  |  21<H>  ----------------------------<  106<H>  4.8   |  27  |  0.8    Ca    8.7      08 Mar 2023 06:46  Mg     2.2     03-08    TPro  5.7<L>  /  Alb  3.6  /  TBili  0.3  /  DBili  x   /  AST  21  /  ALT  43<H>  /  AlkPhos  84  03-07    LIVER FUNCTIONS - ( 07 Mar 2023 06:16 )  Alb: 3.6 g/dL / Pro: 5.7 g/dL / ALK PHOS: 84 U/L / ALT: 43 U/L / AST: 21 U/L / GGT: x             Troponin T, Serum: <0.01 ng/mL (02-28-23 @ 18:48)    Serum Pro-Brain Natriuretic Peptide: 3719 pg/mL (03-02)        Medications:  albuterol    90 MICROgram(s) HFA Inhaler 2 Puff(s) Inhalation two times a day  albuterol/ipratropium for Nebulization 3 milliLiter(s) Nebulizer every 6 hours  apixaban 5 milliGRAM(s) Oral every 12 hours  budesonide 160 MICROgram(s)/formoterol 4.5 MICROgram(s) Inhaler 2 Puff(s) Inhalation two times a day  ferrous    sulfate 325 milliGRAM(s) Oral daily  furosemide    Tablet 40 milliGRAM(s) Oral daily  metoprolol tartrate 50 milliGRAM(s) Oral every 12 hours  montelukast 10 milliGRAM(s) Oral daily  sacubitril 24 mG/valsartan 26 mG 1 Tablet(s) Oral two times a day        Drips:  sodium chloride 0.9%. 1000 milliLiter(s) (100 mL/Hr) IV Continuous <Continuous>    PRN:     Allergies    [This allergen will not trigger allergy alert] environmental allegies (Unknown)  No Known Drug Allergies    Intolerances

## 2023-03-08 NOTE — PROGRESS NOTE ADULT - ASSESSMENT
Assessment:  67 yr old female Pmhx of asthma with multiple hospitalizations without intubation last 15 years ago, iron deficiency anemia. Presented to ED  with SOB found to have HFrEF and severe MR. LHC on 3/6/2023 revealed non -obstructive CAD. Patient transferred  to cardiac tele for evaluation of nonischemic cardiomyopathy. Cardia MRI with anesthesia pending.    Problems discussed and associated plan    # nonischemic cardiomyopathy  #severe MR  - Acute HFrEF  - Echo LVEF 20-25%, severe MR< mild TR, mild AR. (LVEDP 12)  - Lasix 40mg Po daily.   - Increase Metoprolol 25 mg BID to 50 mg BID (switch metoprolol tartrate to succinate on discharge)   - c/w entresto 24/26  - Cath 3/6 : Normal Coronary Angiogram, non-ischemic cardiomyopathy  - Pending cardiac MRI : FH of amyloidosis   - Continue I/Os monitoring  - Keep K > 4.0 and Mg > 2.0  - F/u for labs for amyloidosis work up.    # Likely LV thrombus on echo with contrast   -start Eliquis 5 mg q 12 hours.    # Ventricular Tachycardia:  - EP consult recommended life vest on discharge  - Increase Metoprolol 25 mg BID to 50 mg BID    # hx of iron deficiency anemia  - f/u on ferritin and iron studies     #Asthma  -continue albuterol PRN  -Continue Symbicort  -C/w montelukast  - Sleep study out patient , may need BIPAP during sleep     Assessment:  67 yr old female Pmhx of asthma with multiple hospitalizations without intubation last 15 years ago, iron deficiency anemia. Presented to ED  with SOB found to have HFrEF and severe MR. LHC on 3/6/2023 revealed non -obstructive CAD. Patient transferred  to cardiac tele for evaluation of nonischemic cardiomyopathy. Cardia MRI with anesthesia pending.    Problems discussed and associated plan    # nonischemic cardiomyopathy  #severe MR  - Acute HFrEF  - Echo LVEF 20-25%, severe MR< mild TR, mild AR. (LVEDP 12)  - Lasix 40mg Po daily.   - c/w Metoprolol 50 mg BID (switch metoprolol tartrate to succinate on discharge)   - c/w entresto 24/26  - Cath 3/6 : Normal Coronary Angiogram, non-ischemic cardiomyopathy  - Pending cardiac MRI : FH of amyloidosis   - Continue I/Os monitoring  - Keep K > 4.0 and Mg > 2.0  - F/u for labs for amyloidosis work up. serum IF, serum FLC, and urine IF  - Obtain PYP scan for amyloidosis work up.    # Likely LV thrombus on echo with contrast   -start Eliquis 5 mg q 12 hours.    # Ventricular Tachycardia:  - EP consult recommended life vest on discharge  -c/w Metoprolol 50 mg BID    # hx of iron deficiency anemia  - Ferritin 42   - Start IV iron sucrose 200mg  IV X 5 days    #Asthma  -continue albuterol PRN  -Continue Symbicort  -C/w montelukast  - Sleep study out patient , may need BIPAP during sleep

## 2023-03-09 LAB
ANION GAP SERPL CALC-SCNC: 12 MMOL/L — SIGNIFICANT CHANGE UP (ref 7–14)
BASOPHILS # BLD AUTO: 0.03 K/UL — SIGNIFICANT CHANGE UP (ref 0–0.2)
BASOPHILS NFR BLD AUTO: 0.6 % — SIGNIFICANT CHANGE UP (ref 0–1)
BUN SERPL-MCNC: 24 MG/DL — HIGH (ref 10–20)
CALCIUM SERPL-MCNC: 8 MG/DL — LOW (ref 8.4–10.5)
CHLORIDE SERPL-SCNC: 105 MMOL/L — SIGNIFICANT CHANGE UP (ref 98–110)
CO2 SERPL-SCNC: 20 MMOL/L — SIGNIFICANT CHANGE UP (ref 17–32)
CREAT SERPL-MCNC: 1.1 MG/DL — SIGNIFICANT CHANGE UP (ref 0.7–1.5)
CULTURE RESULTS: SIGNIFICANT CHANGE UP
CULTURE RESULTS: SIGNIFICANT CHANGE UP
EGFR: 56 ML/MIN/1.73M2 — LOW
EOSINOPHIL # BLD AUTO: 0.05 K/UL — SIGNIFICANT CHANGE UP (ref 0–0.7)
EOSINOPHIL NFR BLD AUTO: 1 % — SIGNIFICANT CHANGE UP (ref 0–8)
FLUAV H3 RNA SPEC QL NAA+PROBE: DETECTED
GLUCOSE SERPL-MCNC: 95 MG/DL — SIGNIFICANT CHANGE UP (ref 70–99)
HCT VFR BLD CALC: 38.3 % — SIGNIFICANT CHANGE UP (ref 37–47)
HGB BLD-MCNC: 12.1 G/DL — SIGNIFICANT CHANGE UP (ref 12–16)
IMM GRANULOCYTES NFR BLD AUTO: 0.4 % — HIGH (ref 0.1–0.3)
KAPPA LC SER QL IFE: 2.18 MG/DL — HIGH (ref 0.33–1.94)
KAPPA/LAMBDA FREE LIGHT CHAIN RATIO, SERUM: 1.23 RATIO — SIGNIFICANT CHANGE UP (ref 0.26–1.65)
LAMBDA LC SER QL IFE: 1.77 MG/DL — SIGNIFICANT CHANGE UP (ref 0.57–2.63)
LYMPHOCYTES # BLD AUTO: 0.56 K/UL — LOW (ref 1.2–3.4)
LYMPHOCYTES # BLD AUTO: 11.7 % — LOW (ref 20.5–51.1)
MAGNESIUM SERPL-MCNC: 2 MG/DL — SIGNIFICANT CHANGE UP (ref 1.8–2.4)
MCHC RBC-ENTMCNC: 28.1 PG — SIGNIFICANT CHANGE UP (ref 27–31)
MCHC RBC-ENTMCNC: 31.6 G/DL — LOW (ref 32–37)
MCV RBC AUTO: 88.9 FL — SIGNIFICANT CHANGE UP (ref 81–99)
MONOCYTES # BLD AUTO: 0.46 K/UL — SIGNIFICANT CHANGE UP (ref 0.1–0.6)
MONOCYTES NFR BLD AUTO: 9.6 % — HIGH (ref 1.7–9.3)
NEUTROPHILS # BLD AUTO: 3.65 K/UL — SIGNIFICANT CHANGE UP (ref 1.4–6.5)
NEUTROPHILS NFR BLD AUTO: 76.7 % — HIGH (ref 42.2–75.2)
NRBC # BLD: 0 /100 WBCS — SIGNIFICANT CHANGE UP (ref 0–0)
PLATELET # BLD AUTO: 190 K/UL — SIGNIFICANT CHANGE UP (ref 130–400)
POTASSIUM SERPL-MCNC: 4.5 MMOL/L — SIGNIFICANT CHANGE UP (ref 3.5–5)
POTASSIUM SERPL-SCNC: 4.5 MMOL/L — SIGNIFICANT CHANGE UP (ref 3.5–5)
PROCALCITONIN SERPL-MCNC: 0.5 NG/ML — HIGH (ref 0.02–0.1)
PROT ?TM UR-MCNC: 10 MG/DL — SIGNIFICANT CHANGE UP (ref 0–12)
RAPID RVP RESULT: DETECTED
RBC # BLD: 4.31 M/UL — SIGNIFICANT CHANGE UP (ref 4.2–5.4)
RBC # FLD: 13.3 % — SIGNIFICANT CHANGE UP (ref 11.5–14.5)
SARS-COV-2 RNA SPEC QL NAA+PROBE: SIGNIFICANT CHANGE UP
SODIUM SERPL-SCNC: 137 MMOL/L — SIGNIFICANT CHANGE UP (ref 135–146)
SPECIMEN SOURCE: SIGNIFICANT CHANGE UP
SPECIMEN SOURCE: SIGNIFICANT CHANGE UP
WBC # BLD: 4.77 K/UL — LOW (ref 4.8–10.8)
WBC # FLD AUTO: 4.77 K/UL — LOW (ref 4.8–10.8)

## 2023-03-09 PROCEDURE — 99233 SBSQ HOSP IP/OBS HIGH 50: CPT

## 2023-03-09 PROCEDURE — 78803 RP LOCLZJ TUM SPECT 1 AREA: CPT | Mod: 26

## 2023-03-09 RX ORDER — GUAIFENESIN/DEXTROMETHORPHAN 600MG-30MG
10 TABLET, EXTENDED RELEASE 12 HR ORAL EVERY 6 HOURS
Refills: 0 | Status: DISCONTINUED | OUTPATIENT
Start: 2023-03-09 | End: 2023-03-11

## 2023-03-09 RX ORDER — SODIUM CHLORIDE 9 MG/ML
1000 INJECTION, SOLUTION INTRAVENOUS
Refills: 0 | Status: DISCONTINUED | OUTPATIENT
Start: 2023-03-09 | End: 2023-03-09

## 2023-03-09 RX ADMIN — Medication 50 MILLIGRAM(S): at 17:20

## 2023-03-09 RX ADMIN — Medication 3 MILLILITER(S): at 08:46

## 2023-03-09 RX ADMIN — Medication 75 MILLIGRAM(S): at 17:17

## 2023-03-09 RX ADMIN — APIXABAN 5 MILLIGRAM(S): 2.5 TABLET, FILM COATED ORAL at 05:37

## 2023-03-09 RX ADMIN — CHLORHEXIDINE GLUCONATE 1 APPLICATION(S): 213 SOLUTION TOPICAL at 11:45

## 2023-03-09 RX ADMIN — SODIUM CHLORIDE 75 MILLILITER(S): 9 INJECTION, SOLUTION INTRAVENOUS at 00:36

## 2023-03-09 RX ADMIN — MONTELUKAST 10 MILLIGRAM(S): 4 TABLET, CHEWABLE ORAL at 11:39

## 2023-03-09 RX ADMIN — IRON SUCROSE 110 MILLIGRAM(S): 20 INJECTION, SOLUTION INTRAVENOUS at 11:39

## 2023-03-09 RX ADMIN — Medication 10 MILLILITER(S): at 21:32

## 2023-03-09 RX ADMIN — APIXABAN 5 MILLIGRAM(S): 2.5 TABLET, FILM COATED ORAL at 17:19

## 2023-03-09 RX ADMIN — CEFEPIME 100 MILLIGRAM(S): 1 INJECTION, POWDER, FOR SOLUTION INTRAMUSCULAR; INTRAVENOUS at 05:37

## 2023-03-09 RX ADMIN — Medication 325 MILLIGRAM(S): at 11:39

## 2023-03-09 NOTE — PROGRESS NOTE ADULT - ASSESSMENT
Assessment:  67 yr old female Pmhx of asthma with multiple hospitalizations without intubation last 15 years ago, iron deficiency anemia. Presented to ED  with SOB found to have HFrEF and severe MR. LHC on 3/6/2023 revealed non -obstructive CAD. Patient transferred  to cardiac tele for evaluation of nonischemic cardiomyopathy, being initiated on GDMT. cMRI aborted 2/2 claustrophobia and influenza +. Awaitng LifeVest     Problems discussed and associated plan  # nonischemic cardiomyopathy  #severe MR  - Acute HFrEF  - Echo LVEF 20-25%, severe MR< mild TR, mild AR. (LVEDP 12)  - Lasix 40mg Po daily.   - c/w Metoprolol 50 mg BID (switch metoprolol tartrate to succinate on discharge)   - c/w entresto 24/26  - Cath 3/6 : Normal Coronary Angiogram, non-ischemic cardiomyopathy  - cardiac MRI will be done outpatient; pt is flu + and hx of asthma so difficult intubation at this time   - Continue I/Os monitoring  - Keep K > 4.0 and Mg > 2.0  - Kappa/Lambda 1.23 and FISH Lambda 1.77   - PYP scan negative for TTR uptake in myocardium compared to bone     # Likely LV thrombus on echo with contrast   -start Eliquis 5 mg q 12 hours.    # Ventricular Tachycardia:  - EP consult recommended life vest on discharge  -c/w Metoprolol 50 mg BID    # hx of iron deficiency anemia  - Ferritin 42   - c/w  IV iron sucrose 200mg  IV X 5 days    #Asthma  -continue albuterol PRN  -Continue Symbicort  -C/w montelukast  - Sleep study out patient , may need BIPAP during sleep

## 2023-03-09 NOTE — PROGRESS NOTE ADULT - SUBJECTIVE AND OBJECTIVE BOX
Chief complaint: Patient is a 67y old  Female who presents with a chief complaint of SOB (08 Mar 2023 08:17)    Interval history:  Overnight pt presented with chills; febrile 102.7 and hypotensive; started on Lactated ringers for 6 hours   Influenza +; Abx stopped since source of infection known   cMRI cancelled; no anasthesiologist  IV iron started   Review of systems: A complete 10-point review of systems was obtained and is negative except as stated in the interval history.    Vitals:  T(F): 99.4, Max: 102.1 ( @ 18:40)  HR: 93 (90 - 133)  BP: 88/53 (70/46 - 134/70)  RR: 17 (16 - 18)  SpO2: 96% (92% - 99%)    Ins & outs:      @ 07:01  -  08 @ 07:00  --------------------------------------------------------  IN: 440 mL / OUT: 0 mL / NET: 440 mL     @ 07:01  -  -09 @ 07:00  --------------------------------------------------------  IN: 1602 mL / OUT: 600 mL / NET: 1002 mL      Weight trend:  Weight (kg): 99.8 ()    Physical exam:  GENERAL: NAD, lying in bed comfortably  HEAD:  Atraumatic, normocephalic  EYES: EOMI, PERRLA, conjunctiva and sclera clear  ENT: Moist mucous membranes  NECK: Supple, no JVD  HEART: Regular rate and rhythm, Murmur present, no rubs, or gallops  LUNGS: Unlabored respirations.  Clear to auscultation bilaterally, no crackles, wheezing, or rhonchi  ABDOMEN: Soft, nontender, nondistended, +BS  EXTREMITIES: 2+ peripheral pulses bilaterally. No clubbing, cyanosis, or edema  NERVOUS SYSTEM:  A&Ox3, no focal deficits   SKIN: No rashes or lesions    Data reviewed:  - Telemetry:  5 b nsvt  HR   - ECG < from: 12 Lead ECG (23 @ 18:48) >  Ventricular Rate 103 BPM    Atrial Rate 103 BPM    P-R Interval 152 ms    QRS Duration 98 ms    Q-T Interval 366 ms    QTC Calculation(Bazett) 479 ms    P Axis 70 degrees    R Axis 37 degrees    T Axis 75 degrees    Diagnosis Line Sinus tachycardiawith occasional Premature ventricular complexes  Nonspecific T wave abnormality  Abnormal ECG       - TTE   < from: TTE Echo Complete w/ Contrast w/ Doppler (23 @ 14:38) >  Summary:   1. Left ventricular ejection fraction, by visual estimation, is 20 to   25%.   2. Severely decreased global left ventricular systolic function.   3. Mildly enlarged left atrium.   4. Normal right atrial size.   5. Mild thickening of the anterior and posterior mitral valve leaflets.   6. Severe mitral valve regurgitation.   7. Mild tricuspid regurgitation.   8. Mild aortic regurgitation.   9. Endocardial visualization was enhanced with intravenous echo contrast.    - Chest x-ray < from: Xray Chest 1 View- PORTABLE-Routine (Xray Chest 1 View- PORTABLE-Routine in AM.) (23 @ 06:58) >  Impression:  Unchanged pulmonary vascular congestion.    - Cardiac catheterization: 3/6/2023  FINDINGS:   Coronary Dominance: Right dominate    LM: No significant disease    LAD: No significant disease    CX: No significant disease    RCA: No significant disease     LVEDP: 12 mmHg     ESTIMATED BLOOD LOSS: < 10 mL      POST-OP DIAGNOSIS:      [X] Normal Coronary Angiogram, non-ischemic cardiomyopathy    - Labs:                        12.1   4.77  )-----------( 190      ( 09 Mar 2023 07:12 )             38.3         137  |  105  |  24<H>  ----------------------------<  95  4.5   |  20  |  1.1    Ca    8.0<L>      09 Mar 2023 07:12  Mg     2.0               Serum Pro-Brain Natriuretic Peptide: 3719 pg/mL ()          Urinalysis Basic - ( 08 Mar 2023 20:00 )    Color: Yellow / Appearance: Clear / S.016 / pH: x  Gluc: x / Ketone: Negative  / Bili: Negative / Urobili: <2 mg/dL   Blood: x / Protein: Trace / Nitrite: Negative   Leuk Esterase: Moderate / RBC: 3 /HPF / WBC 3 /HPF   Sq Epi: x / Non Sq Epi: 1 /HPF / Bacteria: Negative        Medications:  albuterol    90 MICROgram(s) HFA Inhaler 2 Puff(s) Inhalation two times a day  albuterol/ipratropium for Nebulization 3 milliLiter(s) Nebulizer every 6 hours  apixaban 5 milliGRAM(s) Oral every 12 hours  budesonide 160 MICROgram(s)/formoterol 4.5 MICROgram(s) Inhaler 2 Puff(s) Inhalation two times a day  chlorhexidine 2% Cloths 1 Application(s) Topical daily  ferrous    sulfate 325 milliGRAM(s) Oral daily  influenza  Vaccine (HIGH DOSE) 0.7 milliLiter(s) IntraMuscular once  iron sucrose IVPB 200 milliGRAM(s) IV Intermittent every 24 hours  metoprolol tartrate 50 milliGRAM(s) Oral every 12 hours  montelukast 10 milliGRAM(s) Oral daily    Drips:    PRN:     Allergies    [This allergen will not trigger allergy alert] environmental allegies (Unknown)  No Known Drug Allergies    Intolerances

## 2023-03-10 LAB
ANION GAP SERPL CALC-SCNC: 10 MMOL/L — SIGNIFICANT CHANGE UP (ref 7–14)
BUN SERPL-MCNC: 20 MG/DL — SIGNIFICANT CHANGE UP (ref 10–20)
CALCIUM SERPL-MCNC: 8.6 MG/DL — SIGNIFICANT CHANGE UP (ref 8.4–10.4)
CHLORIDE SERPL-SCNC: 105 MMOL/L — SIGNIFICANT CHANGE UP (ref 98–110)
CO2 SERPL-SCNC: 22 MMOL/L — SIGNIFICANT CHANGE UP (ref 17–32)
CREAT SERPL-MCNC: 0.9 MG/DL — SIGNIFICANT CHANGE UP (ref 0.7–1.5)
EGFR: 71 ML/MIN/1.73M2 — SIGNIFICANT CHANGE UP
GLUCOSE SERPL-MCNC: 108 MG/DL — HIGH (ref 70–99)
HCT VFR BLD CALC: 37 % — SIGNIFICANT CHANGE UP (ref 37–47)
HGB BLD-MCNC: 11.7 G/DL — LOW (ref 12–16)
MAGNESIUM SERPL-MCNC: 2.1 MG/DL — SIGNIFICANT CHANGE UP (ref 1.8–2.4)
MCHC RBC-ENTMCNC: 28.2 PG — SIGNIFICANT CHANGE UP (ref 27–31)
MCHC RBC-ENTMCNC: 31.6 G/DL — LOW (ref 32–37)
MCV RBC AUTO: 89.2 FL — SIGNIFICANT CHANGE UP (ref 81–99)
NRBC # BLD: 0 /100 WBCS — SIGNIFICANT CHANGE UP (ref 0–0)
PLATELET # BLD AUTO: 196 K/UL — SIGNIFICANT CHANGE UP (ref 130–400)
POTASSIUM SERPL-MCNC: 4.2 MMOL/L — SIGNIFICANT CHANGE UP (ref 3.5–5)
POTASSIUM SERPL-SCNC: 4.2 MMOL/L — SIGNIFICANT CHANGE UP (ref 3.5–5)
RBC # BLD: 4.15 M/UL — LOW (ref 4.2–5.4)
RBC # FLD: 13.2 % — SIGNIFICANT CHANGE UP (ref 11.5–14.5)
SODIUM SERPL-SCNC: 137 MMOL/L — SIGNIFICANT CHANGE UP (ref 135–146)
WBC # BLD: 3.91 K/UL — LOW (ref 4.8–10.8)
WBC # FLD AUTO: 3.91 K/UL — LOW (ref 4.8–10.8)

## 2023-03-10 PROCEDURE — 99239 HOSP IP/OBS DSCHRG MGMT >30: CPT

## 2023-03-10 RX ORDER — METOPROLOL TARTRATE 50 MG
50 TABLET ORAL DAILY
Refills: 0 | Status: DISCONTINUED | OUTPATIENT
Start: 2023-03-10 | End: 2023-03-11

## 2023-03-10 RX ORDER — DAPAGLIFLOZIN 10 MG/1
1 TABLET, FILM COATED ORAL
Qty: 30 | Refills: 0
Start: 2023-03-10 | End: 2023-04-08

## 2023-03-10 RX ORDER — DAPAGLIFLOZIN 10 MG/1
10 TABLET, FILM COATED ORAL EVERY 24 HOURS
Refills: 0 | Status: DISCONTINUED | OUTPATIENT
Start: 2023-03-10 | End: 2023-03-11

## 2023-03-10 RX ADMIN — IRON SUCROSE 110 MILLIGRAM(S): 20 INJECTION, SOLUTION INTRAVENOUS at 12:45

## 2023-03-10 RX ADMIN — DAPAGLIFLOZIN 10 MILLIGRAM(S): 10 TABLET, FILM COATED ORAL at 11:25

## 2023-03-10 RX ADMIN — BUDESONIDE AND FORMOTEROL FUMARATE DIHYDRATE 2 PUFF(S): 160; 4.5 AEROSOL RESPIRATORY (INHALATION) at 20:43

## 2023-03-10 RX ADMIN — Medication 10 MILLILITER(S): at 05:39

## 2023-03-10 RX ADMIN — CHLORHEXIDINE GLUCONATE 1 APPLICATION(S): 213 SOLUTION TOPICAL at 12:44

## 2023-03-10 RX ADMIN — Medication 3 MILLILITER(S): at 15:16

## 2023-03-10 RX ADMIN — Medication 3 MILLILITER(S): at 10:06

## 2023-03-10 RX ADMIN — Medication 75 MILLIGRAM(S): at 05:39

## 2023-03-10 RX ADMIN — BUDESONIDE AND FORMOTEROL FUMARATE DIHYDRATE 2 PUFF(S): 160; 4.5 AEROSOL RESPIRATORY (INHALATION) at 12:44

## 2023-03-10 RX ADMIN — APIXABAN 5 MILLIGRAM(S): 2.5 TABLET, FILM COATED ORAL at 05:41

## 2023-03-10 RX ADMIN — Medication 325 MILLIGRAM(S): at 11:25

## 2023-03-10 RX ADMIN — Medication 10 MILLILITER(S): at 17:55

## 2023-03-10 RX ADMIN — Medication 75 MILLIGRAM(S): at 17:54

## 2023-03-10 RX ADMIN — MONTELUKAST 10 MILLIGRAM(S): 4 TABLET, CHEWABLE ORAL at 11:25

## 2023-03-10 RX ADMIN — Medication 50 MILLIGRAM(S): at 11:25

## 2023-03-10 RX ADMIN — Medication 3 MILLILITER(S): at 22:11

## 2023-03-10 RX ADMIN — Medication 10 MILLILITER(S): at 11:26

## 2023-03-10 RX ADMIN — APIXABAN 5 MILLIGRAM(S): 2.5 TABLET, FILM COATED ORAL at 17:55

## 2023-03-10 NOTE — PROGRESS NOTE ADULT - NS ATTEND AMEND GEN_ALL_CORE FT
Patient seen and evaluated by me on 3/09/23.     67yoF with asthma (multiple hospitalizations, no recent intubations) and iron deficiency anemia who presented to the ED with SOB, found to have NICM with LVE 20-25% ([facility] on 3/06/23 with no significant disease) and severe MR. Patient is being initiated on GDMT, however her course was c/b fever and hypotension in the s/o influenza. Started on Tamiflu and GDMT discontinued.     For her NICM, serum free light chain ratio is normal and PYP scan is normal, thus ruling out AL and TTR amyloid, respectively. Patient will be discharged with a LifeVest, and GDMT will be reinitiated in the outpatient setting with HF. She was instructed to obtain her family's genetic testing for their reported FHx of cardiac amyloid.     Plan:   - Continue Lopressor to 50 mg BID, will transition to Toprol on discharge  - Hold Entresto and Lasix  - On Eliquis 5 mg BID out of concern for LV thrombus (I personally reviewed the TTE images)  - On 5-day course of IV iron sucrose  - Will need LifeVest on discharge, representative contacted  - HF to see patient tomorrow to establish care
67yoF with asthma (multiple hospitalizations, no recent intubations) and iron deficiency anemia who presented to the ED with SOB, found to have NICM with LVE 20-25% ([facility] on 3/06/23 with no significant disease) and severe MR. Patient is being initiated on GDMT. Awaiting cardiac MRI to rule out infiltrative processes, to quantify scar presence/burden, and to rule out LV thrombus.    Of note, patient has an extensive family history of cardiac amyloidosis.     Plan:   - Serum IF, serum FLC, and urine IF are pending  - Cardiac MRI with anesthesia cannot be performed today or tomorrow  - Will pursue PYP scan instead  - Continue Lopressor to 50 mg BID, will transition to Toprol on discharge  - Decrease Entresto to 0.5 tablet of 24/26 mg BID as patient is orthostatic  - Continue Lasix 40 mg PO daily  - Will consider Farxiga prior to discharge  - Consider spironolactone as an outpatient as patient is orthostatic  - On Eliquis 5 mg BID out of concern for LV thrombus (I personally reviewed the TTE images)  - Start 5-day course of IV iron sucrose  - Will need LifeVest on discharge, representative contacted
Patient seen and evaluated by me on 3/10/23. She will be ready to go home once LifeVest is placed. Home medications will include Toprol 50 mg daily, Farxiga 10 mg daily, Eliquis 5 mg BID, and Lasix PRN.
67yoF with asthma (multiple hospitalizations, no recent intubations) and iron deficiency anemia who presented to the ED with SOB, found to have NICM with LVE 20-25% ([facility] on 3/06/23 with no significant disease) and severe MR. Patient is being initiated on GDMT. Awaiting cardiac MRI to rule out infiltrative processes, to quantify scar presence/burden, and to rule out LV thrombus.    Of note, patient has an extensive family history of cardiac amyloidosis.     Plan:   - Send serum IF, serum FLC, and urine IF  - Cardiac MRI with anesthesia tomorrow  - If CMR not possible tomorrow, will pursue PYP scan  - Increase Lopressor to 50 mg BID, will transition to Toprol on discharge  - Continue Entresto 24/26 mg BID  - Decrease Lasix to 40 mg PO daily   - Will consider spironolactone and Farxiga prior to discharge  - On Eliquis 5 mg BID out of concern for LV thrombus  - Send iron panel (serum iron, ferritin, transferrin, and TIBC). If iron deficient (ferritin < 100, or ferritin 100-300 and iron saturation < 20%), administer IV iron sucrose as 200 mg IV daily over one hour for 5 day.  - Will need LifeVest on discharge

## 2023-03-10 NOTE — PROGRESS NOTE ADULT - PROVIDER SPECIALTY LIST ADULT
Cardiology
Cardiology
Hospitalist
Cardiology
Internal Medicine
Pulmonology
Pulmonology
Hospitalist
Hospitalist

## 2023-03-10 NOTE — PROGRESS NOTE ADULT - ASSESSMENT
Assessment:  67 yr old female Pmhx of asthma with multiple hospitalizations without intubation last 15 years ago, iron deficiency anemia. Presented to ED  with SOB found to have HFrEF and severe MR. LHC on 3/6/2023 revealed non -obstructive CAD. Patient transferred  to cardiac tele for evaluation of nonischemic cardiomyopathy, being initiated on GDMT. cMRI aborted 2/2 claustrophobia and influenza +. Awaitng LifeVest     Problems discussed and associated plan  # nonischemic cardiomyopathy  #severe MR  - Acute HFrEF  - Echo LVEF 20-25%, severe MR< mild TR, mild AR. (LVEDP 12)  - Lasix 40mg Po daily.   - c/w Metoprolol 50 mg BID (switch metoprolol tartrate to succinate on discharge)   - c/w entresto 24/26  - Cath 3/6 : Normal Coronary Angiogram, non-ischemic cardiomyopathy  - cardiac MRI will be done outpatient; pt is flu + and hx of asthma so difficult intubation at this time   - Continue I/Os monitoring  - Keep K > 4.0 and Mg > 2.0  - Kappa/Lambda 1.23 and FISH Lambda 1.77   - PYP scan negative for TTR uptake in myocardium compared to bone   - Will follow up with Dr. Mayers OP    # Likely LV thrombus on echo with contrast   -start Eliquis 5 mg q 12 hours.    # Ventricular Tachycardia:  - EP consult recommended life vest on discharge  -c/w Metoprolol 50 mg BID    # hx of iron deficiency anemia  - Ferritin 42   - c/w  IV iron sucrose 200mg  IV X 5 days    #Asthma  -continue albuterol PRN  -Continue Symbicort  -C/w montelukast  - Sleep study out patient , may need BIPAP during sleep     Assessment:  67 yr old female Pmhx of asthma with multiple hospitalizations without intubation last 15 years ago, iron deficiency anemia. Presented to ED  with SOB found to have HFrEF and severe MR. LHC on 3/6/2023 revealed non -obstructive CAD. Patient transferred  to cardiac tele for evaluation of nonischemic cardiomyopathy, being initiated on GDMT. cMRI aborted 2/2 claustrophobia and influenza +. Awaitng LifeVest     Problems discussed and associated plan  # nonischemic cardiomyopathy  #severe MR  - Acute HFrEF  - Echo LVEF 20-25%, severe MR< mild TR, mild AR. (LVEDP 12)  - Lasix 40mg Po daily.   - Toprol 50 QD   - c/w entresto 24/26  - Cath 3/6 : Normal Coronary Angiogram, non-ischemic cardiomyopathy  - cardiac MRI will be done outpatient; pt is flu + and hx of asthma so difficult intubation at this time   - Continue I/Os monitoring  - Keep K > 4.0 and Mg > 2.0  - Kappa/Lambda 1.23 and FISH Lambda 1.77   - PYP scan negative for TTR uptake in myocardium compared to bone   - Pending LifeVest  - Started on Farxiga   - Will follow up with Dr. Mayers OP    # Likely LV thrombus on echo with contrast   -start Eliquis 5 mg q 12 hours.    # Ventricular Tachycardia:  - EP consult recommended life vest on discharge  -Toprol 50 qd    # hx of iron deficiency anemia  - Ferritin 42   - c/w  IV iron sucrose 200mg  IV X 5 days    #Asthma  -continue albuterol PRN  -Continue Symbicort  -C/w montelukast  - Sleep study out patient , may need BIPAP during sleep

## 2023-03-10 NOTE — PROGRESS NOTE ADULT - SUBJECTIVE AND OBJECTIVE BOX
Chief complaint: Patient is a 67y old  Female who presents with a chief complaint of asthma exacerbation, pneumonia (09 Mar 2023 13:39)    Interval history:  Tamiflu day ; slight SOB but on PRN duonebs  Pending LifeVest   Review of systems: A complete 10-point review of systems was obtained and is negative except as stated in the interval history.    Vitals:  T(F): 98.6, Max: 99.2 (03-10 @ 03:41)  HR: 92 (92 - 102)  BP: 96/55 (77/53 - 100/63)  RR: 21 (18 - 21)  SpO2: 94% (88% - 97%)    Ins & outs:      @ 07:01  -   @ 07:00  --------------------------------------------------------  IN: 440 mL / OUT: 0 mL / NET: 440 mL     @ 07:  -   @ 07:00  --------------------------------------------------------  IN: 1602 mL / OUT: 600 mL / NET: 1002 mL     @ 07:01  -  10 @ 07:00  --------------------------------------------------------  IN: 120 mL / OUT: 0 mL / NET: 120 mL      Weight trend:  Weight (kg): 99.8 ()    Physical exam:  GENERAL: NAD, lying in bed comfortably  HEAD:  Atraumatic, normocephalic  EYES: EOMI, PERRLA, conjunctiva and sclera clear  ENT: Moist mucous membranes  NECK: Supple, no JVD  HEART: Regular rate and rhythm, Murmur present, no rubs, or gallops  LUNGS: Unlabored respirations.  Clear to auscultation bilaterally, no crackles, wheezing, or rhonchi  ABDOMEN: Soft, nontender, nondistended, +BS  EXTREMITIES: 2+ peripheral pulses bilaterally. No clubbing, cyanosis, or edema  NERVOUS SYSTEM:  A&Ox3, no focal deficits   SKIN: No rashes or lesions    Data reviewed:  - Telemetry:   HR 74-94  - ECG < from: 12 Lead ECG (23 @ 18:48) >  Ventricular Rate 103 BPM    Atrial Rate 103 BPM    P-R Interval 152 ms    QRS Duration 98 ms    Q-T Interval 366 ms    QTC Calculation(Bazett) 479 ms    P Axis 70 degrees    R Axis 37 degrees    T Axis 75 degrees    Diagnosis Line Sinus tachycardiawith occasional Premature ventricular complexes  Nonspecific T wave abnormality  Abnormal ECG       - TTE   < from: TTE Echo Complete w/ Contrast w/ Doppler (23 @ 14:38) >  Summary:   1. Left ventricular ejection fraction, by visual estimation, is 20 to   25%.   2. Severely decreased global left ventricular systolic function.   3. Mildly enlarged left atrium.   4. Normal right atrial size.   5. Mild thickening of the anterior and posterior mitral valve leaflets.   6. Severe mitral valve regurgitation.   7. Mild tricuspid regurgitation.   8. Mild aortic regurgitation.   9. Endocardial visualization was enhanced with intravenous echo contrast.    - Chest x-ray < from: Xray Chest 1 View- PORTABLE-Routine (Xray Chest 1 View- PORTABLE-Routine in AM.) (23 @ 06:58) >  Impression:  Unchanged pulmonary vascular congestion.    - Cardiac catheterization: 3/6/2023  FINDINGS:   Coronary Dominance: Right dominate    LM: No significant disease    LAD: No significant disease    CX: No significant disease    RCA: No significant disease     LVEDP: 12 mmHg     ESTIMATED BLOOD LOSS: < 10 mL      POST-OP DIAGNOSIS:      [X] Normal Coronary Angiogram, non-ischemic cardiomyopathy      - Labs:                        11.7   3.91  )-----------( 196      ( 10 Mar 2023 06:27 )             37.0     03-10    137  |  105  |  20  ----------------------------<  108<H>  4.2   |  22  |  0.9    Ca    8.6      10 Mar 2023 06:27  Mg     2.1     03-10          Serum Pro-Brain Natriuretic Peptide: 3719 pg/mL ()          Urinalysis Basic - ( 08 Mar 2023 20:00 )    Color: Yellow / Appearance: Clear / S.016 / pH: x  Gluc: x / Ketone: Negative  / Bili: Negative / Urobili: <2 mg/dL   Blood: x / Protein: Trace / Nitrite: Negative   Leuk Esterase: Moderate / RBC: 3 /HPF / WBC 3 /HPF   Sq Epi: x / Non Sq Epi: 1 /HPF / Bacteria: Negative        Medications:  albuterol    90 MICROgram(s) HFA Inhaler 2 Puff(s) Inhalation two times a day  albuterol/ipratropium for Nebulization 3 milliLiter(s) Nebulizer every 6 hours  apixaban 5 milliGRAM(s) Oral every 12 hours  budesonide 160 MICROgram(s)/formoterol 4.5 MICROgram(s) Inhaler 2 Puff(s) Inhalation two times a day  chlorhexidine 2% Cloths 1 Application(s) Topical daily  ferrous    sulfate 325 milliGRAM(s) Oral daily  influenza  Vaccine (HIGH DOSE) 0.7 milliLiter(s) IntraMuscular once  iron sucrose IVPB 200 milliGRAM(s) IV Intermittent every 24 hours  metoprolol tartrate 50 milliGRAM(s) Oral every 12 hours  montelukast 10 milliGRAM(s) Oral daily  oseltamivir 75 milliGRAM(s) Oral two times a day    Drips:    PRN:     Allergies    [This allergen will not trigger allergy alert] environmental allegies (Unknown)  No Known Drug Allergies    Intolerances

## 2023-03-10 NOTE — PROGRESS NOTE ADULT - NS ATTEST RISK PROBLEM GEN_ALL_CORE FT
need for telemetry.
Need for telemetry
Need for telemetry
direct pt care and interdisciplinary rounds: PNA / Asthma / Dyspnea / HF New / Obesity
Need for telemetry
Need for telemetry

## 2023-03-11 VITALS
OXYGEN SATURATION: 95 % | RESPIRATION RATE: 18 BRPM | TEMPERATURE: 98 F | SYSTOLIC BLOOD PRESSURE: 96 MMHG | HEART RATE: 82 BPM | DIASTOLIC BLOOD PRESSURE: 60 MMHG

## 2023-03-11 LAB
ANION GAP SERPL CALC-SCNC: 8 MMOL/L — SIGNIFICANT CHANGE UP (ref 7–14)
BUN SERPL-MCNC: 17 MG/DL — SIGNIFICANT CHANGE UP (ref 10–20)
CALCIUM SERPL-MCNC: 8.5 MG/DL — SIGNIFICANT CHANGE UP (ref 8.4–10.5)
CHLORIDE SERPL-SCNC: 108 MMOL/L — SIGNIFICANT CHANGE UP (ref 98–110)
CO2 SERPL-SCNC: 23 MMOL/L — SIGNIFICANT CHANGE UP (ref 17–32)
CREAT SERPL-MCNC: 0.9 MG/DL — SIGNIFICANT CHANGE UP (ref 0.7–1.5)
EGFR: 71 ML/MIN/1.73M2 — SIGNIFICANT CHANGE UP
GLUCOSE SERPL-MCNC: 89 MG/DL — SIGNIFICANT CHANGE UP (ref 70–99)
HCT VFR BLD CALC: 35.1 % — LOW (ref 37–47)
HGB BLD-MCNC: 10.9 G/DL — LOW (ref 12–16)
MAGNESIUM SERPL-MCNC: 2.4 MG/DL — SIGNIFICANT CHANGE UP (ref 1.8–2.4)
MCHC RBC-ENTMCNC: 27.8 PG — SIGNIFICANT CHANGE UP (ref 27–31)
MCHC RBC-ENTMCNC: 31.1 G/DL — LOW (ref 32–37)
MCV RBC AUTO: 89.5 FL — SIGNIFICANT CHANGE UP (ref 81–99)
NRBC # BLD: 0 /100 WBCS — SIGNIFICANT CHANGE UP (ref 0–0)
PLATELET # BLD AUTO: 192 K/UL — SIGNIFICANT CHANGE UP (ref 130–400)
POTASSIUM SERPL-MCNC: 4.1 MMOL/L — SIGNIFICANT CHANGE UP (ref 3.5–5)
POTASSIUM SERPL-SCNC: 4.1 MMOL/L — SIGNIFICANT CHANGE UP (ref 3.5–5)
RBC # BLD: 3.92 M/UL — LOW (ref 4.2–5.4)
RBC # FLD: 13.2 % — SIGNIFICANT CHANGE UP (ref 11.5–14.5)
SODIUM SERPL-SCNC: 139 MMOL/L — SIGNIFICANT CHANGE UP (ref 135–146)
WBC # BLD: 2.77 K/UL — LOW (ref 4.8–10.8)
WBC # FLD AUTO: 2.77 K/UL — LOW (ref 4.8–10.8)

## 2023-03-11 PROCEDURE — 99239 HOSP IP/OBS DSCHRG MGMT >30: CPT

## 2023-03-11 RX ORDER — BUDESONIDE AND FORMOTEROL FUMARATE DIHYDRATE 160; 4.5 UG/1; UG/1
2 AEROSOL RESPIRATORY (INHALATION)
Qty: 0 | Refills: 0 | DISCHARGE

## 2023-03-11 RX ORDER — METOPROLOL TARTRATE 50 MG
1 TABLET ORAL
Qty: 30 | Refills: 0
Start: 2023-03-11 | End: 2023-04-09

## 2023-03-11 RX ORDER — ALBUTEROL 90 UG/1
2 AEROSOL, METERED ORAL
Qty: 2 | Refills: 0
Start: 2023-03-11 | End: 2023-04-09

## 2023-03-11 RX ORDER — APIXABAN 2.5 MG/1
1 TABLET, FILM COATED ORAL
Qty: 60 | Refills: 0
Start: 2023-03-11 | End: 2023-04-09

## 2023-03-11 RX ORDER — FUROSEMIDE 40 MG
1 TABLET ORAL
Qty: 30 | Refills: 0
Start: 2023-03-11 | End: 2023-04-09

## 2023-03-11 RX ORDER — BUDESONIDE AND FORMOTEROL FUMARATE DIHYDRATE 160; 4.5 UG/1; UG/1
2 AEROSOL RESPIRATORY (INHALATION)
Qty: 2 | Refills: 0
Start: 2023-03-11 | End: 2023-04-09

## 2023-03-11 RX ORDER — FERROUS SULFATE 325(65) MG
1 TABLET ORAL
Qty: 30 | Refills: 0
Start: 2023-03-11 | End: 2023-04-09

## 2023-03-11 RX ORDER — ALBUTEROL 90 UG/1
2 AEROSOL, METERED ORAL
Qty: 0 | Refills: 0 | DISCHARGE

## 2023-03-11 RX ADMIN — Medication 50 MILLIGRAM(S): at 05:50

## 2023-03-11 RX ADMIN — Medication 75 MILLIGRAM(S): at 05:50

## 2023-03-11 RX ADMIN — Medication 3 MILLILITER(S): at 08:09

## 2023-03-11 RX ADMIN — APIXABAN 5 MILLIGRAM(S): 2.5 TABLET, FILM COATED ORAL at 05:50

## 2023-03-11 RX ADMIN — Medication 10 MILLILITER(S): at 10:54

## 2023-03-11 RX ADMIN — DAPAGLIFLOZIN 10 MILLIGRAM(S): 10 TABLET, FILM COATED ORAL at 10:54

## 2023-03-11 RX ADMIN — MONTELUKAST 10 MILLIGRAM(S): 4 TABLET, CHEWABLE ORAL at 10:54

## 2023-03-11 RX ADMIN — Medication 325 MILLIGRAM(S): at 10:54

## 2023-03-12 ENCOUNTER — TRANSCRIPTION ENCOUNTER (OUTPATIENT)
Age: 68
End: 2023-03-12

## 2023-03-13 ENCOUNTER — TRANSCRIPTION ENCOUNTER (OUTPATIENT)
Age: 68
End: 2023-03-13

## 2023-03-13 LAB — INTERPRETATION SERPL IFE-IMP: SIGNIFICANT CHANGE UP

## 2023-03-13 RX ORDER — BUDESONIDE AND FORMOTEROL FUMARATE DIHYDRATE 160; 4.5 UG/1; UG/1
160-4.5 AEROSOL RESPIRATORY (INHALATION) TWICE DAILY
Qty: 1 | Refills: 6 | Status: DISCONTINUED | COMMUNITY
Start: 2021-10-26 | End: 2023-03-13

## 2023-03-13 RX ORDER — PREDNISONE 10 MG/1
10 TABLET ORAL
Qty: 20 | Refills: 3 | Status: DISCONTINUED | COMMUNITY
Start: 2021-06-17 | End: 2023-03-13

## 2023-03-13 RX ORDER — IPRATROPIUM BROMIDE AND ALBUTEROL SULFATE 2.5; .5 MG/3ML; MG/3ML
0.5-2.5 (3) SOLUTION RESPIRATORY (INHALATION)
Qty: 1 | Refills: 0 | Status: ACTIVE | COMMUNITY
Start: 2023-03-13 | End: 1900-01-01

## 2023-03-14 ENCOUNTER — APPOINTMENT (OUTPATIENT)
Dept: CARE COORDINATION | Facility: HOME HEALTH | Age: 68
End: 2023-03-14
Payer: OTHER GOVERNMENT

## 2023-03-14 VITALS
SYSTOLIC BLOOD PRESSURE: 114 MMHG | RESPIRATION RATE: 15 BRPM | OXYGEN SATURATION: 98 % | DIASTOLIC BLOOD PRESSURE: 78 MMHG | HEART RATE: 79 BPM

## 2023-03-14 DIAGNOSIS — J96.00 ACUTE RESPIRATORY FAILURE, UNSPECIFIED WHETHER WITH HYPOXIA OR HYPERCAPNIA: ICD-10-CM

## 2023-03-14 DIAGNOSIS — J45.909 UNSPECIFIED ASTHMA, UNCOMPLICATED: ICD-10-CM

## 2023-03-14 DIAGNOSIS — Z86.2 PERSONAL HISTORY OF DISEASES OF THE BLOOD AND BLOOD-FORMING ORGANS AND CERTAIN DISORDERS INVOLVING THE IMMUNE MECHANISM: ICD-10-CM

## 2023-03-14 DIAGNOSIS — Z87.09 PERSONAL HISTORY OF OTHER DISEASES OF THE RESPIRATORY SYSTEM: ICD-10-CM

## 2023-03-14 DIAGNOSIS — Z98.890 OTHER SPECIFIED POSTPROCEDURAL STATES: ICD-10-CM

## 2023-03-14 LAB
CULTURE RESULTS: SIGNIFICANT CHANGE UP
CULTURE RESULTS: SIGNIFICANT CHANGE UP
INTERPRETATION 24H UR IFE-IMP: SIGNIFICANT CHANGE UP
INTERPRETATION 24H UR IFE-IMP: SIGNIFICANT CHANGE UP
SPECIMEN SOURCE: SIGNIFICANT CHANGE UP
SPECIMEN SOURCE: SIGNIFICANT CHANGE UP

## 2023-03-14 PROCEDURE — 99495 TRANSJ CARE MGMT MOD F2F 14D: CPT

## 2023-03-15 ENCOUNTER — TRANSCRIPTION ENCOUNTER (OUTPATIENT)
Age: 68
End: 2023-03-15

## 2023-03-15 ENCOUNTER — APPOINTMENT (OUTPATIENT)
Dept: CARDIOLOGY | Facility: CLINIC | Age: 68
End: 2023-03-15
Payer: OTHER GOVERNMENT

## 2023-03-15 VITALS
BODY MASS INDEX: 32.52 KG/M2 | OXYGEN SATURATION: 97 % | WEIGHT: 214.6 LBS | SYSTOLIC BLOOD PRESSURE: 100 MMHG | HEIGHT: 68 IN | DIASTOLIC BLOOD PRESSURE: 66 MMHG | HEART RATE: 76 BPM

## 2023-03-15 DIAGNOSIS — Z78.9 OTHER SPECIFIED HEALTH STATUS: ICD-10-CM

## 2023-03-15 DIAGNOSIS — Z83.49 FAMILY HISTORY OF OTHER ENDOCRINE, NUTRITIONAL AND METABOLIC DISEASES: ICD-10-CM

## 2023-03-15 PROBLEM — Z87.09 HISTORY OF ASTHMA: Status: RESOLVED | Noted: 2023-03-15 | Resolved: 2023-03-15

## 2023-03-15 PROBLEM — J96.00 ARF (ACUTE RESPIRATORY FAILURE): Status: ACTIVE | Noted: 2023-03-15

## 2023-03-15 PROCEDURE — 99072 ADDL SUPL MATRL&STAF TM PHE: CPT

## 2023-03-15 PROCEDURE — 93000 ELECTROCARDIOGRAM COMPLETE: CPT | Mod: 59

## 2023-03-15 PROCEDURE — 99215 OFFICE O/P EST HI 40 MIN: CPT | Mod: 25

## 2023-03-15 NOTE — HISTORY OF PRESENT ILLNESS
[FreeTextEntry1] : follow up after being discharged form the hospital for ARF and CHF.  [de-identified] : Patient is a 67 year old female enrolled in the [facility] TCM program s/p a recent discharge from [facility] on 2/28/23 - 3/11//23 for ARF. PMH asthma, CHF, CAD. Patient was given duo nebs, Symbicort, iv steroids, iv abx and sent home, declined HCS. Pt was sent home with Life vest, EF 20-23. Upon arrival patient alert in NAD, denies CP, SOB, edema, fever, chills, or n/v/d. F/U appointment with cardio luis antonio on 3/22, cardio with Talib appointment pending and pulmonary, appointment pending. Patient was contacted by RN Brittnee Stoddard from TCM and medication reconciliation was done on 3/13/23, within 48 hours of discharge. \par \par Copied from [facility]:\par \par Hospital Course: 67-year-old female past medical history of asthma with multiple hospitalizations without intubation, last was 15 years ago, iron deficiency anemia presents with SOB. History goes back to 3 weeks ago where patient had SOB with cough and had to use her Symbicort and albuterol rescue inhaler more often. Patient use her Symbicort inhaler twice daily at baseline. Over the last 3 days her shortness of breath acutely worsened where she is using her albuterol inhaler every 15 to 20 minutes with no relief. Her oxygen saturation is 95% at baseline, but it decreased to 92%. Patient was picked up by EMS and given DuoNebs that made her feel better. No nausea, vomiting, fever, chest pain, back pain, abdominal pain. No sputum production.\par Patient was managed for acute HFrEF. Nonischemic Cardiomyopathy / CXR showed bilateral interstitial infiltrates / Echo LVEF 20-25%, severe MR< mild TR, mild AR. Patient was discharged with LifeVest \par Patient is being discharged Metoprolol 50 mg QD and farxiga, patient could not tolerate Entresto 2/2 hypotension. Cardiac cath 3/6 showed normal Coronary arteries. \par  Patient will follow up with Dr. Brady in 2 weeks and Dr. Mayers in two weeks.

## 2023-03-15 NOTE — ASSESSMENT
[FreeTextEntry1] : Patient is a 67 year old female enrolled in the hospitals TCM program s/p a recent discharge from [facility] on 2/28/23 - 3/11//23 for ARF. PMH asthma, CHF, CAD. Patient was given duo nebs, Symbicort, iv steroids, iv abx and sent home, declined HCS. Pt was sent home with Life vest, EF 20-23. Upon arrival patient alert in NAD, denies CP, SOB, edema, fever, chills, or n/v/d. F/U appointment with cardio luis antonio on 3/22, cardio with Mayers appointment pending and pulmonary, appointment pending. Patient was contacted by RN Brittnee Stoddard from TCM and medication reconciliation was done on 3/13/23, within 48 hours of discharge.

## 2023-03-15 NOTE — COUNSELING
[de-identified] : Pt was informed about CN’s role/ STARS program and overview of transitional care reviewed with patient. Pt educated on topics of importance such as compliance with prescribed medication regimen, COPD action plan and escalation process, adequate hydration, and proper diet. Pt encouraged calling CN with any issues, concerns or questions, also educated to notify CN if experiencing CP, SOB, cough, increased mucus production, increased use of rescue inhaler, fever, chills, fatigue, “chest cold symptoms” dizziness, lightheadedness, n/v/d/c, swelling to extremities and/or any signs of ARF / asthma exacerbation/flare as reviewed. Reassurance provided. Will continue to monitor\par \par Pt was informed about CN’s role/ STARS program and overview of transitional care reviewed with patient. In addition, yellow contact card was provided. Pt educated on topics of importance such as compliance with all provider visits, prescribed medication regimen, and low salt diet. Pt encouraged calling CN with any issues, concerns or questions, also educated to notify CN if experiencing CP, SOB , cough, increased mucus/phlegm production, abdominal discomfort/swelling, difficulty sleeping or lying flat, fever, chills, fatigue, weight gain of 2-3lbs in 24 hours or 5lbs in one week,  dizziness, lightheadedness, n/v/d/c, swelling to extremities and/or any signs of CHF exacerbation as reviewed. Reassurance provided. Will continue to monitor\par \par \par

## 2023-03-15 NOTE — PHYSICAL EXAM
[No Acute Distress] : no acute distress [Well-Appearing] : well-appearing [Normal Sclera/Conjunctiva] : normal sclera/conjunctiva [Supple] : supple [No Respiratory Distress] : no respiratory distress  [No Accessory Muscle Use] : no accessory muscle use [Clear to Auscultation] : lungs were clear to auscultation bilaterally [Normal Rate] : normal rate  [Regular Rhythm] : with a regular rhythm [No Edema] : there was no peripheral edema [Soft] : abdomen soft [Non Tender] : non-tender [Normal] : no rash [Coordination Grossly Intact] : coordination grossly intact [Normal Gait] : normal gait [Normal Affect] : the affect was normal [Alert and Oriented x3] : oriented to person, place, and time [Normal Mood] : the mood was normal [de-identified] : wearing life vest

## 2023-03-15 NOTE — PLAN
[FreeTextEntry1] : ARF - asthma is chronic. improved. continue Symbicort daily. use alb inhaler and nebs PRN. continue Singulair. deep breathing exercises reviewed, ambulation discussed. Pt needed refill on singular and nebs, rx e-scribed to pharmacy of choice. f/u with your pulmonologist, appointment pending. \par \par HFrEF - EF 20-23. discharged with LifeVest. continue to take metoprolol, furosemide, apixaban, dapagliflozin f/u with Dr Armenta, appointments pending. reviewed i & o, daily weights, low Na diet. \par \par CAD - chronic. hx cath on 3/6. continue apixaban. f/u with cardio dr salmon on 3/22, dr armenta appointment pending. \par

## 2023-03-17 ENCOUNTER — TRANSCRIPTION ENCOUNTER (OUTPATIENT)
Age: 68
End: 2023-03-17

## 2023-03-21 NOTE — ASSESSMENT
[FreeTextEntry1] : 67 year old lady with PMHx asthma, JENARO, NICM, severe MR presenting for initial evaluation\par \par HFrEF 2/2 NICM, severe MR\par -currently euvolemic on exam\par -EF 20-25% 3/2023\par -recent hospitalization 3/11/2023 discharge\par -c/w metoprolol, Farxiga\par -was unable to tolerate entresto. Will retry with 1/2 tab.\par -IVC mildly dilated\par -Lifevest for now \par -BMP next week\par -lasix 20mg M,W, F\par \par LV thrombus\par -c/w eliquis\par \par RTC 2 weeks with NP and 6 weeks with me\par \par \par \par \par Ankur Sanchez MD, FACC, FSA \par Advanced Heart Failure/ Mechanical Circulatory Support\par Pulmonary Hypertension and Cardiac Amyloidosis \par [facility]\par [facility]  \par \par \par \par

## 2023-03-21 NOTE — HISTORY OF PRESENT ILLNESS
[FreeTextEntry1] : 67 year old lady with PMHx asthma, JENARO, NICM, severe MR presenting for initial evaluation. She was recently discharged  from the hospital after an admission for Flu and was found to have new HFrEF 2/2 NICM, severe MR. She was discharged with a Lifevest. She states that she feels well overall and has no current complaints. She has been compliant with her medications and denies any SOB, cp, palpitations, SRINIVAS, numbness, tingling, weakness, dizziness, syncope, fever, chills, n/v/d/c. Of note, she states that she was preparing dinner and the Lifevest began to beep which she triggered off.\par \par EKG (3/15/2023): NSR, non-specific T wave abnl\par TTE(3/2023): EF 20-25%, severe MR, mild TR, mild AR\par Cath(3/2023): non-obstructive CAD\par \par

## 2023-03-22 ENCOUNTER — APPOINTMENT (OUTPATIENT)
Dept: CARDIOLOGY | Facility: CLINIC | Age: 68
End: 2023-03-22
Payer: OTHER GOVERNMENT

## 2023-03-22 VITALS
HEIGHT: 68 IN | HEART RATE: 69 BPM | BODY MASS INDEX: 33.04 KG/M2 | DIASTOLIC BLOOD PRESSURE: 69 MMHG | SYSTOLIC BLOOD PRESSURE: 112 MMHG | WEIGHT: 218 LBS

## 2023-03-22 DIAGNOSIS — E66.9 OBESITY, UNSPECIFIED: ICD-10-CM

## 2023-03-22 DIAGNOSIS — Z82.49 FAMILY HISTORY OF ISCHEMIC HEART DISEASE AND OTHER DISEASES OF THE CIRCULATORY SYSTEM: ICD-10-CM

## 2023-03-22 DIAGNOSIS — I34.0 NONRHEUMATIC MITRAL (VALVE) INSUFFICIENCY: ICD-10-CM

## 2023-03-22 DIAGNOSIS — I11.0 HYPERTENSIVE HEART DISEASE WITH HEART FAILURE: ICD-10-CM

## 2023-03-22 DIAGNOSIS — I47.20 VENTRICULAR TACHYCARDIA, UNSPECIFIED: ICD-10-CM

## 2023-03-22 DIAGNOSIS — F40.240 CLAUSTROPHOBIA: ICD-10-CM

## 2023-03-22 DIAGNOSIS — I50.21 ACUTE SYSTOLIC (CONGESTIVE) HEART FAILURE: ICD-10-CM

## 2023-03-22 DIAGNOSIS — I95.9 HYPOTENSION, UNSPECIFIED: ICD-10-CM

## 2023-03-22 DIAGNOSIS — I51.3 INTRACARDIAC THROMBOSIS, NOT ELSEWHERE CLASSIFIED: ICD-10-CM

## 2023-03-22 DIAGNOSIS — D50.9 IRON DEFICIENCY ANEMIA, UNSPECIFIED: ICD-10-CM

## 2023-03-22 DIAGNOSIS — J10.1 INFLUENZA DUE TO OTHER IDENTIFIED INFLUENZA VIRUS WITH OTHER RESPIRATORY MANIFESTATIONS: ICD-10-CM

## 2023-03-22 DIAGNOSIS — Z87.891 PERSONAL HISTORY OF NICOTINE DEPENDENCE: ICD-10-CM

## 2023-03-22 DIAGNOSIS — Z78.9 OTHER SPECIFIED HEALTH STATUS: ICD-10-CM

## 2023-03-22 DIAGNOSIS — I42.8 OTHER CARDIOMYOPATHIES: ICD-10-CM

## 2023-03-22 DIAGNOSIS — G47.33 OBSTRUCTIVE SLEEP APNEA (ADULT) (PEDIATRIC): ICD-10-CM

## 2023-03-22 DIAGNOSIS — J45.51 SEVERE PERSISTENT ASTHMA WITH (ACUTE) EXACERBATION: ICD-10-CM

## 2023-03-22 DIAGNOSIS — J96.22 ACUTE AND CHRONIC RESPIRATORY FAILURE WITH HYPERCAPNIA: ICD-10-CM

## 2023-03-22 PROCEDURE — 93000 ELECTROCARDIOGRAM COMPLETE: CPT

## 2023-03-22 PROCEDURE — 99214 OFFICE O/P EST MOD 30 MIN: CPT

## 2023-03-22 PROCEDURE — 99072 ADDL SUPL MATRL&STAF TM PHE: CPT

## 2023-03-22 NOTE — ASSESSMENT
[FreeTextEntry1] : 67 F with NICM / HFrEF 20-25% and severe MR.\par \par \par Continue GDMT for HF as prescribed\par Moving to [place] in July\par HF / EP follow up\par Follow up 3 months

## 2023-03-22 NOTE — HISTORY OF PRESENT ILLNESS
[FreeTextEntry1] : 67 F with NICM / HFrEF 20-25% and severe MR presents follow up s/p discharge.\par \par Main complaint is fatigue\par Denies any CP, SOB, palpitations, orthopnea/PND, dizziness or syncope\par Tolerating Entresto 0.5 tablet twice daily\par On Eliquis for ?suspected LV thrombus\par Wearing Lifevest\par \par  for 35 years\par Moving to [place] in July\par

## 2023-03-30 ENCOUNTER — APPOINTMENT (OUTPATIENT)
Dept: CARDIOLOGY | Facility: CLINIC | Age: 68
End: 2023-03-30
Payer: OTHER GOVERNMENT

## 2023-03-30 VITALS
OXYGEN SATURATION: 98 % | HEART RATE: 67 BPM | WEIGHT: 215.3 LBS | DIASTOLIC BLOOD PRESSURE: 62 MMHG | BODY MASS INDEX: 32.63 KG/M2 | HEIGHT: 68 IN | SYSTOLIC BLOOD PRESSURE: 96 MMHG

## 2023-03-30 PROCEDURE — 99214 OFFICE O/P EST MOD 30 MIN: CPT

## 2023-03-30 PROCEDURE — 99072 ADDL SUPL MATRL&STAF TM PHE: CPT

## 2023-03-30 NOTE — HISTORY OF PRESENT ILLNESS
[FreeTextEntry1] : 67 year old lady with PMHx asthma, JENARO, NICM, severe MR presenting for initial evaluation. She was recently discharged  from the hospital after an admission for Flu and was found to have new HFrEF 2/2 NICM, severe MR. She was discharged with a Lifevest. Here for a follow up. \par \par \par The patient reports overall feeling well, she can walk for over 45 mins and climbs stairs without any limitations. Her weight at home fluctuates from 212 to 214 lbs and her SBP stays at 110s. She has been compliant with her medications and denies any SOB, cp, palpitations, SRINIVAS, numbness, tingling, weakness, dizziness, syncope, fever, chills, n/v/d/c.\par \par EKG (3/15/2023): NSR, non-specific T wave abnl\par TTE(3/2023): EF 20-25%, severe MR, mild TR, mild AR\par Cath(3/2023): non-obstructive CAD\par \par

## 2023-03-30 NOTE — ASSESSMENT
[FreeTextEntry1] : 67 year old lady with PMHx asthma, JENARO, NICM, severe MR \par \par HFrEF 2/2 NICM, severe MR\par \par - Euvolemic on exam\par - Decrease Furosemide to 20 mg twice a week\par - Continue Metoprolol 50 mg daily\par - Continue Farxiga 10 mg daily\par - Increase Entresto to a full tablet 24/26 mg \par - Lifevest for now \par - BMP in a week after increasing Entresto\par \par LV thrombus\par -c/w eliquis\par \par RTC in 6 weeks\par  \par \par \par \par

## 2023-03-31 RX ORDER — METOPROLOL SUCCINATE 50 MG/1
50 TABLET, EXTENDED RELEASE ORAL DAILY
Qty: 90 | Refills: 3 | Status: ACTIVE | COMMUNITY
Start: 1900-01-01 | End: 1900-01-01

## 2023-03-31 RX ORDER — DAPAGLIFLOZIN 10 MG/1
10 TABLET, FILM COATED ORAL DAILY
Qty: 90 | Refills: 3 | Status: ACTIVE | COMMUNITY
Start: 1900-01-01 | End: 1900-01-01

## 2023-04-04 ENCOUNTER — TRANSCRIPTION ENCOUNTER (OUTPATIENT)
Age: 68
End: 2023-04-04

## 2023-05-12 ENCOUNTER — TRANSCRIPTION ENCOUNTER (OUTPATIENT)
Age: 68
End: 2023-05-12

## 2023-05-12 ENCOUNTER — APPOINTMENT (OUTPATIENT)
Dept: CARDIOLOGY | Facility: CLINIC | Age: 68
End: 2023-05-12
Payer: OTHER GOVERNMENT

## 2023-05-12 VITALS
RESPIRATION RATE: 15 BRPM | DIASTOLIC BLOOD PRESSURE: 70 MMHG | TEMPERATURE: 97.8 F | HEIGHT: 68 IN | HEART RATE: 68 BPM | SYSTOLIC BLOOD PRESSURE: 120 MMHG | OXYGEN SATURATION: 96 % | WEIGHT: 220 LBS | BODY MASS INDEX: 33.34 KG/M2

## 2023-05-12 PROCEDURE — 93000 ELECTROCARDIOGRAM COMPLETE: CPT

## 2023-05-12 PROCEDURE — 99214 OFFICE O/P EST MOD 30 MIN: CPT

## 2023-05-16 ENCOUNTER — APPOINTMENT (OUTPATIENT)
Dept: PULMONOLOGY | Facility: CLINIC | Age: 68
End: 2023-05-16
Payer: OTHER GOVERNMENT

## 2023-05-16 VITALS
DIASTOLIC BLOOD PRESSURE: 78 MMHG | WEIGHT: 220 LBS | HEIGHT: 68 IN | BODY MASS INDEX: 33.34 KG/M2 | HEART RATE: 81 BPM | RESPIRATION RATE: 14 BRPM | SYSTOLIC BLOOD PRESSURE: 136 MMHG | OXYGEN SATURATION: 96 %

## 2023-05-16 PROCEDURE — 94010 BREATHING CAPACITY TEST: CPT

## 2023-05-16 PROCEDURE — 99213 OFFICE O/P EST LOW 20 MIN: CPT | Mod: 25

## 2023-05-16 RX ORDER — FERROUS SULFATE 325(65) MG
325 TABLET ORAL DAILY
Refills: 0 | Status: COMPLETED | COMMUNITY
End: 2023-05-16

## 2023-05-16 RX ORDER — MONTELUKAST 10 MG/1
10 TABLET, FILM COATED ORAL
Qty: 30 | Refills: 3 | Status: ACTIVE | COMMUNITY
Start: 2023-05-16 | End: 1900-01-01

## 2023-05-16 NOTE — REVIEW OF SYSTEMS
[Dyspnea] : dyspnea [SOB on Exertion] : sob on exertion [Negative] : Endocrine [Cough] : no cough [Hemoptysis] : no hemoptysis [Chest Tightness] : no chest tightness [Frequent URIs] : no frequent URIs [Sputum] : no sputum [Pleuritic Pain] : no pleuritic pain [Wheezing] : no wheezing

## 2023-05-16 NOTE — REASON FOR VISIT
[Follow-Up] : a follow-up visit [Sleep Evaluation] : sleep evaluation [Asthma] : asthma [Sleep Apnea] : sleep apnea

## 2023-05-16 NOTE — HISTORY OF PRESENT ILLNESS
[TextBox_4] : Female with a past medical history of asthma, cardiomyopathy with severe mitral regurgitation and LV thrombus, recent admission to the hospital with heart failure and reduced ejection fraction exacerbation, hypoxemic/hypercapnic respiratory failure, discharged with a LifeVest.  She has a family history of amyloid.  She also has a history of JENNIFER but is not currently on CPAP.  Given her hypercapnia she would need an in lab sleep study with possible CPAP/BiPAP.  Her asthma is currently well controlled on Symbicort 160 twice daily and albuterol that she rarely uses.  She is also on Singulair.  Her lungs are clear on today's exam.

## 2023-05-19 NOTE — HISTORY OF PRESENT ILLNESS
[FreeTextEntry1] : 67 year old lady with PMHx asthma, JENARO, NICM, severe MR presenting for initial evaluation. She was recently discharged  from the hospital after an admission for Flu and was found to have new HFrEF 2/2 NICM, severe MR. She was discharged with a Lifevest. Here for a follow up. \par \par \par Ms. Chen reports feeling well, she can walk every day for several blocks without any SOB. Her weight at home fluctuates from 218 to 220 lbs and her SBP stays at 120s. The patient denies CP, bleeding, SOB at rest, PND, abdominal discomfort, LH/dizziness, BLE edema, palpitations, and syncope. She takes her medications as indicated and follows a low-sodium diet. \par \par \par EKG (3/15/2023): NSR, non-specific T wave abnl\par TTE(3/2023): EF 20-25%, severe MR, mild TR, mild AR\par Cath(3/2023): non-obstructive CAD\par \par

## 2023-05-19 NOTE — ASSESSMENT
[FreeTextEntry1] : 67 year old lady with PMHx asthma, JENARO, NICM, severe MR, LV Thrombus\par \par HFrEF 2/2 NICM/ severe MR/ LV thrombus\par \par - Euvolemic on exam\par - Decrease Furosemide to 20 mg twice a week\par - Continue Metoprolol 50 mg daily\par - Continue Farxiga 10 mg daily\par - Increase Entresto to 49/51 mg BID\par - Lifevest for now \par - BMP in a week after increasing Entresto\par \par LV thrombus\par -c/w eliquis\par \par RTC in 6 weeks\par \par \par \par Ankur Sanchez MD, FACC, FSA \par Advanced Heart Failure/ Mechanical Circulatory Support\par Pulmonary Hypertension and Cardiac Amyloidosis \par [facility]\par [facility]  \par \par \par  \par \par \par \par

## 2023-06-27 ENCOUNTER — APPOINTMENT (OUTPATIENT)
Dept: PULMONOLOGY | Facility: HOSPITAL | Age: 68
End: 2023-06-27

## 2023-07-03 ENCOUNTER — NON-APPOINTMENT (OUTPATIENT)
Age: 68
End: 2023-07-03

## 2023-07-07 RX ORDER — FUROSEMIDE 20 MG/1
20 TABLET ORAL
Qty: 90 | Refills: 3 | Status: ACTIVE | COMMUNITY
Start: 2023-03-15 | End: 1900-01-01

## 2023-07-10 ENCOUNTER — APPOINTMENT (OUTPATIENT)
Dept: CARDIOLOGY | Facility: CLINIC | Age: 68
End: 2023-07-10
Payer: OTHER GOVERNMENT

## 2023-07-10 PROCEDURE — 93306 TTE W/DOPPLER COMPLETE: CPT

## 2023-07-15 ENCOUNTER — APPOINTMENT (OUTPATIENT)
Dept: SLEEP CENTER | Facility: HOSPITAL | Age: 68
End: 2023-07-15

## 2023-07-25 ENCOUNTER — APPOINTMENT (OUTPATIENT)
Dept: PULMONOLOGY | Facility: CLINIC | Age: 68
End: 2023-07-25
Payer: OTHER GOVERNMENT

## 2023-07-25 ENCOUNTER — APPOINTMENT (OUTPATIENT)
Dept: CARDIOLOGY | Facility: CLINIC | Age: 68
End: 2023-07-25
Payer: OTHER GOVERNMENT

## 2023-07-25 VITALS
DIASTOLIC BLOOD PRESSURE: 70 MMHG | HEART RATE: 80 BPM | BODY MASS INDEX: 33.6 KG/M2 | SYSTOLIC BLOOD PRESSURE: 112 MMHG | WEIGHT: 221 LBS

## 2023-07-25 VITALS
SYSTOLIC BLOOD PRESSURE: 130 MMHG | BODY MASS INDEX: 33.34 KG/M2 | DIASTOLIC BLOOD PRESSURE: 80 MMHG | WEIGHT: 220 LBS | OXYGEN SATURATION: 96 % | HEART RATE: 73 BPM | HEIGHT: 68 IN

## 2023-07-25 DIAGNOSIS — J45.909 UNSPECIFIED ASTHMA, UNCOMPLICATED: ICD-10-CM

## 2023-07-25 PROCEDURE — 93000 ELECTROCARDIOGRAM COMPLETE: CPT

## 2023-07-25 PROCEDURE — 99214 OFFICE O/P EST MOD 30 MIN: CPT

## 2023-07-25 RX ORDER — APIXABAN 5 MG/1
5 TABLET, FILM COATED ORAL
Qty: 180 | Refills: 2 | Status: DISCONTINUED | COMMUNITY
Start: 1900-01-01 | End: 2023-07-25

## 2023-07-25 RX ORDER — MONTELUKAST 10 MG/1
10 TABLET, FILM COATED ORAL DAILY
Qty: 30 | Refills: 0 | Status: DISCONTINUED | COMMUNITY
Start: 2023-03-13 | End: 2023-07-25

## 2023-07-25 RX ORDER — SACUBITRIL AND VALSARTAN 24; 26 MG/1; MG/1
24-26 TABLET, FILM COATED ORAL
Qty: 30 | Refills: 3 | Status: DISCONTINUED | COMMUNITY
Start: 2023-03-15 | End: 2023-07-25

## 2023-07-25 RX ORDER — SACUBITRIL AND VALSARTAN 49; 51 MG/1; MG/1
49-51 TABLET, FILM COATED ORAL TWICE DAILY
Refills: 0 | Status: ACTIVE | COMMUNITY

## 2023-07-25 NOTE — ASSESSMENT
[FreeTextEntry1] : 67 F with NICM / HFrEF 20-25% and severe MR.\par \par TTE reviewed - EF improved to 40%, Mild MR\par Currently taking Lasix 20 mg 3 times/week\par Denies any CP, SOB, palpitations, orthopnea/PND, dizziness or syncope\par \par Continue GDMT for HF as prescribed\par Moving to [place] in September\par HF / EP follow up\par

## 2023-07-25 NOTE — HISTORY OF PRESENT ILLNESS
[TextBox_4] : Female with a past medical history of asthma, cardiomyopathy with severe mitral regurgitation and LV thrombus, recent admission to the hospital with heart failure and reduced ejection fraction exacerbation, hypoxemic/hypercapnic respiratory failure, discharged with a LifeVest.  She has a family history of amyloid.  She also has a history of JENNIFER but is not currently on CPAP.  Given her hypercapnia she would need an in lab sleep study with possible CPAP/BiPAP.  Her asthma is currently well controlled on Symbicort 160 twice daily and albuterol that she rarely uses.  She is also on Singulair.  Her lungs are clear on today's exam.\par \par 7/25/23: Sleep study and PFTs not done due to copay issues. Asthma well controlled on Symbicort BID and she rarely uses Albuterol. She is following with cardiology. She is planning to move to [place] in [facility]. I will order APAP for her and see if it gets approved.

## 2023-07-25 NOTE — HISTORY OF PRESENT ILLNESS
[FreeTextEntry1] : 67 F with NICM / HFrEF 20-25% and severe MR.\par \par Main complaint is fatigue\par Tolerating Entresto 0.5 tablet twice daily\par On Eliquis for ?suspected LV thrombus\par Wearing Lifevest\par \par  for 35 years\par Moving to [place] in July\par \par TTE reviewed - EF improved to 40%, Mild MR\par Currently taking Lasix 20 mg 3 times/week\par Denies any CP, SOB, palpitations, orthopnea/PND, dizziness or syncope\par

## 2023-07-25 NOTE — REVIEW OF SYSTEMS
[Negative] : Endocrine [Cough] : no cough [Hemoptysis] : no hemoptysis [Chest Tightness] : no chest tightness [Frequent URIs] : no frequent URIs [Sputum] : no sputum [Dyspnea] : no dyspnea [Pleuritic Pain] : no pleuritic pain [Wheezing] : no wheezing [SOB on Exertion] : no sob on exertion [TextBox_30] : No significant dyspnea; still has some shortness of breath with exertion

## 2023-08-09 ENCOUNTER — APPOINTMENT (OUTPATIENT)
Dept: CARDIOLOGY | Facility: CLINIC | Age: 68
End: 2023-08-09
Payer: OTHER GOVERNMENT

## 2023-08-09 VITALS
OXYGEN SATURATION: 97 % | DIASTOLIC BLOOD PRESSURE: 74 MMHG | WEIGHT: 220 LBS | HEIGHT: 68 IN | SYSTOLIC BLOOD PRESSURE: 116 MMHG | BODY MASS INDEX: 33.34 KG/M2 | HEART RATE: 72 BPM

## 2023-08-09 DIAGNOSIS — I25.10 ATHEROSCLEROTIC HEART DISEASE OF NATIVE CORONARY ARTERY W/OUT ANGINA PECTORIS: ICD-10-CM

## 2023-08-09 PROCEDURE — 99214 OFFICE O/P EST MOD 30 MIN: CPT

## 2023-08-18 NOTE — ASSESSMENT
[FreeTextEntry1] :  HFrEF 2/2 NICM/ severe MR/ LV thrombus  - Euvolemic on exam - Continue Furosemide 20 mg MWF If weight gain of 2 lbs or more in a day, take an extra tablet for that day - Continue Metoprolol 50 mg daily - Continue Farxiga 10 mg daily - Continue Entresto 49/51 mg BID - Continue AC  - Lifevest for now  - Blood work  - RTO in 6 weeks    The patient was counseled on lifestyle modifications to eat a heart-healthy diet, including sodium restriction and regular exercise. Also, she was instructed to keep a log of her blood pressure, heart rate, and daily weight. The weight is recommended to take first thing in the morning upon voiding. The blood pressure and heart rate can be taken anytime during the day.

## 2023-08-18 NOTE — HISTORY OF PRESENT ILLNESS
[FreeTextEntry1] : 67 year old lady with PMHx asthma, JENARO, NICM, severe MR presenting for initial evaluation. She was admitted to the hospital 3/23 for Flu and was found to have new HFrEF 2/2 NICM, severe MR. She was discharged with a Lifevest. Here for a follow up.   TTE 7/23 EF 42% TTE 3/23 EF 20-25%, severe MR, mild TR, mild AR Cath 3/23 non-obstructive CAD  Ms. Chen reports she is doing OK; she walks over one mile a day without any SOB. Her weight at home fluctuates from 215 to 220 lbs and her SBP stays at 120s. The patient denies CP, bleeding, SOB at rest, PND, abdominal discomfort, LH/dizziness, BLE edema, palpitations, and syncope. She takes her medications as indicated and follows a low-sodium diet.

## 2023-09-22 ENCOUNTER — APPOINTMENT (OUTPATIENT)
Dept: CARDIOLOGY | Facility: CLINIC | Age: 68
End: 2023-09-22
Payer: OTHER GOVERNMENT

## 2023-09-22 VITALS
DIASTOLIC BLOOD PRESSURE: 80 MMHG | HEART RATE: 68 BPM | SYSTOLIC BLOOD PRESSURE: 118 MMHG | WEIGHT: 221 LBS | BODY MASS INDEX: 33.49 KG/M2 | HEIGHT: 68 IN | OXYGEN SATURATION: 96 %

## 2023-09-22 DIAGNOSIS — I42.8 OTHER CARDIOMYOPATHIES: ICD-10-CM

## 2023-09-22 DIAGNOSIS — G47.30 SLEEP APNEA, UNSPECIFIED: ICD-10-CM

## 2023-09-22 DIAGNOSIS — I34.0 NONRHEUMATIC MITRAL (VALVE) INSUFFICIENCY: ICD-10-CM

## 2023-09-22 DIAGNOSIS — I51.3 INTRACARDIAC THROMBOSIS, NOT ELSEWHERE CLASSIFIED: ICD-10-CM

## 2023-09-22 PROCEDURE — 93000 ELECTROCARDIOGRAM COMPLETE: CPT

## 2023-09-22 PROCEDURE — 99214 OFFICE O/P EST MOD 30 MIN: CPT

## 2023-10-09 RX ORDER — BUDESONIDE AND FORMOTEROL FUMARATE DIHYDRATE 160; 4.5 UG/1; UG/1
160-4.5 AEROSOL RESPIRATORY (INHALATION) TWICE DAILY
Qty: 1 | Refills: 1 | Status: ACTIVE | COMMUNITY
Start: 2022-07-20 | End: 1900-01-01

## 2023-10-13 ENCOUNTER — APPOINTMENT (OUTPATIENT)
Dept: CARDIOLOGY | Facility: CLINIC | Age: 68
End: 2023-10-13
Payer: MEDICARE

## 2023-10-13 DIAGNOSIS — I50.22 CHRONIC SYSTOLIC (CONGESTIVE) HEART FAILURE: ICD-10-CM

## 2023-10-13 PROCEDURE — 93306 TTE W/DOPPLER COMPLETE: CPT

## 2023-12-01 ENCOUNTER — EMERGENCY (EMERGENCY)
Facility: HOSPITAL | Age: 68
LOS: 0 days | Discharge: HOME | End: 2023-12-01
Attending: EMERGENCY MEDICINE
Payer: MEDICARE

## 2023-12-01 VITALS
SYSTOLIC BLOOD PRESSURE: 149 MMHG | RESPIRATION RATE: 18 BRPM | HEART RATE: 75 BPM | WEIGHT: 220.02 LBS | OXYGEN SATURATION: 97 % | DIASTOLIC BLOOD PRESSURE: 73 MMHG | TEMPERATURE: 98 F | HEIGHT: 68 IN

## 2023-12-01 DIAGNOSIS — I11.0 HYPERTENSIVE HEART DISEASE WITH HEART FAILURE: ICD-10-CM

## 2023-12-01 DIAGNOSIS — R05.9 COUGH, UNSPECIFIED: ICD-10-CM

## 2023-12-01 DIAGNOSIS — Z87.891 PERSONAL HISTORY OF NICOTINE DEPENDENCE: ICD-10-CM

## 2023-12-01 DIAGNOSIS — Z91.09 OTHER ALLERGY STATUS, OTHER THAN TO DRUGS AND BIOLOGICAL SUBSTANCES: ICD-10-CM

## 2023-12-01 DIAGNOSIS — Z96.659 PRESENCE OF UNSPECIFIED ARTIFICIAL KNEE JOINT: Chronic | ICD-10-CM

## 2023-12-01 DIAGNOSIS — J45.909 UNSPECIFIED ASTHMA, UNCOMPLICATED: ICD-10-CM

## 2023-12-01 DIAGNOSIS — I50.20 UNSPECIFIED SYSTOLIC (CONGESTIVE) HEART FAILURE: ICD-10-CM

## 2023-12-01 DIAGNOSIS — R09.81 NASAL CONGESTION: ICD-10-CM

## 2023-12-01 DIAGNOSIS — J06.9 ACUTE UPPER RESPIRATORY INFECTION, UNSPECIFIED: ICD-10-CM

## 2023-12-01 DIAGNOSIS — Z87.01 PERSONAL HISTORY OF PNEUMONIA (RECURRENT): ICD-10-CM

## 2023-12-01 DIAGNOSIS — R06.7 SNEEZING: ICD-10-CM

## 2023-12-01 PROCEDURE — 99283 EMERGENCY DEPT VISIT LOW MDM: CPT | Mod: 25

## 2023-12-01 PROCEDURE — 99284 EMERGENCY DEPT VISIT MOD MDM: CPT | Mod: GC

## 2023-12-01 PROCEDURE — 71046 X-RAY EXAM CHEST 2 VIEWS: CPT

## 2023-12-01 PROCEDURE — 71046 X-RAY EXAM CHEST 2 VIEWS: CPT | Mod: 26

## 2023-12-01 NOTE — ED ADULT TRIAGE NOTE - BSA (M2)
I will give her this refill when I see her come back in for either A) her blood pressure recheck B) diabetes education appointment or C) her follow up appointment with me at the end of sept.    Of course if it is needed for critical reasons I will also give it to her.      Please relay message.        Edenilson   2.13

## 2023-12-01 NOTE — ED PROVIDER NOTE - CARE PROVIDER_API CALL
Cj, Publius  Pulmonary Disease  [address] [place] [address]  Phone: [phone]  Fax: [phone]  Follow Up Time: 1-3 Days

## 2023-12-01 NOTE — ED PROVIDER NOTE - CLINICAL SUMMARY MEDICAL DECISION MAKING FREE TEXT BOX
67-year-old female with cough/sneezing and nasal congestion intermittent for months.  Patient appears very well, afebrile, normal work of breathing, lungs clear to auscultation.  Chest x-ray is negative for acute pulmonary process.  Positive sick contacts at home.  Prescription for Tessalon Perles was sent to the patient's pharmacy, she was advised to follow-up with her PCP, strict return precautions given.  She verbalized understanding is amenable with the plan.

## 2023-12-01 NOTE — ED PROVIDER NOTE - ATTENDING CONTRIBUTION TO CARE
67-year-old female PMH as noted in chart presenting for evaluation of cough, nasal congestion and sneezing intermittently for the past month.  Patient states that her young  grandchildren are intermittently sick with similar complaints.  She denies any shortness of breath, no leg pain or swelling, no change in exercise tolerance, no hemoptysis fever chills or any other associated complaints.  According to the patient this does not feel like asthma exacerbation.  Cough seems to be worse at night.  Well-appearing well-nourished, NAD, head AT/NC, PERRL, pink conjunctivae,  mmm, nml oropharynx, +nasal congestion, supple neck , nml work of breathing, lungs CTA b/l, equal air entry, RRR, well-perfused extremities, distal pulses intact, no leg edema or unilateral calf swelling, A&Ox3, no focal neuro deficits, nml mood and affect.  Impression/plan most likely viral URI will obtain chest x-ray, anticipate DC home.  Patient is amenable to plan.

## 2023-12-01 NOTE — ED PROVIDER NOTE - PATIENT PORTAL LINK FT
You can access the FollowMyHealth Patient Portal offered by [facility] by registering at the following website: [email or web address]. By joining [employer]’s FollowMyHealth portal, you will also be able to view your health information using other applications (apps) compatible with our system.

## 2023-12-01 NOTE — ED PROVIDER NOTE - PHYSICAL EXAMINATION
CONSTITUTIONAL: well-appearing, well nourished, non-toxic, NAD  SKIN: Warm dry, normal skin turgor  HEAD: NCAT  EYES: EOMI, no scleral icterus  ENT: Moist mucous membranes, normal pharynx with no erythema or exudates, normal TMs b/l, no sinus tenderness  NECK: Supple; non tender. Full ROM. No cervical LAD  CARD: RRR, no murmurs, rubs or gallops  RESP: clear to ausculation b/l.  No rales, rhonchi, or wheezing.  EXT: Full ROM, no bony tenderness, no calf tenderness, pre tibial edema b/l  NEURO: normal motor. normal sensory.   PSYCH: Cooperative, appropriate.

## 2023-12-01 NOTE — ED ADULT NURSE NOTE - OBJECTIVE STATEMENT
Pt c/o "im having cough and sneezing on and off for a month". Pt breathing spontaneous and unlabored on RA

## 2023-12-01 NOTE — ED PROVIDER NOTE - NSFOLLOWUPINSTRUCTIONS_ED_ALL_ED_FT
your medication from [facility]  Follow up with your Primary Care Physician    Viral Respiratory Infection  A viral respiratory infection is an illness that affects parts of the body that are used for breathing. These include the lungs, nose, and throat. It is caused by a germ called a virus.    Some examples of this kind of infection are:  A cold.  The flu (influenza).  A respiratory syncytial virus (RSV) infection.  What are the causes?  This condition is caused by a virus. It spreads from person to person. You can get the virus if:  You breathe in droplets from someone who is sick.  You come in contact with people who are sick.  You touch mucus or other fluid from a person who is sick.  What are the signs or symptoms?  Symptoms of this condition include:  A stuffy or runny nose.  A sore throat.  A cough.  Shortness of breath.  Trouble breathing.  Yellow or green fluid in the nose.  Other symptoms may include:  A fever.  Sweating or chills.  Tiredness (fatigue).  Achy muscles.  A headache.  How is this treated?  This condition may be treated with:  Medicines that treat viruses.  Medicines that make it easy to breathe.  Medicines that are sprayed into the nose.  Acetaminophen or NSAIDs, such as ibuprofen, to treat fever.  Follow these instructions at home:  Managing pain and congestion    Take over-the-counter and prescription medicines only as told by your doctor.  If you have a sore throat, gargle with salt water. Do this 3–4 times a day or as needed.  To make salt water, dissolve ½–1 tsp (3–6 g) of salt in 1 cup (237 mL) of warm water. Make sure that all the salt dissolves.  Use nose drops made from salt water. This helps with stuffiness (congestion). It also helps soften the skin around your nose.  Take 2 tsp (10 mL) of honey at bedtime to lessen coughing at night.  Do not give honey to children who are younger than 1 year old.  Drink enough fluid to keep your pee (urine) pale yellow.  General instructions    A sign telling the reader not to smoke.  Rest as much as possible.  Do not drink alcohol.  Do not smoke or use any products that contain nicotine or tobacco. If you need help quitting, ask your doctor.  Keep all follow-up visits.  How is this prevented?    Washing hands with soap and water.  A person covering her mouth and nose with a cloth while sneezing.  Get a flu shot every year. Ask your doctor when you should get your flu shot.  Do not let other people get your germs. If you are sick:  Wash your hands with soap and water often. Wash your hands after you cough or sneeze. Wash hands for at least 20 seconds. If you cannot use soap and water, use hand .  Cover your mouth when you cough. Cover your nose and mouth when you sneeze.  Do not share cups or eating utensils.  Clean commonly used objects often. Clean commonly touched surfaces.  Stay home from work or school.  Avoid contact with people who are sick during cold and flu season. This is in fall and winter.  Get help if:  Your symptoms last for 10 days or longer.  Your symptoms get worse over time.  You have very bad pain in your face or forehead.  Parts of your jaw or neck get very swollen.  You have shortness of breath.  Get help right away if:  You feel pain or pressure in your chest.  You have trouble breathing.  You faint or feel like you will faint.  You keep vomiting and it gets worse.  You feel confused.  These symptoms may be an emergency. Get help right away. Call your local emergency services (911 in the [place]  Do not wait to see if the symptoms will go away.  Do not drive yourself to the hospital.  Summary  A viral respiratory infection is an illness that affects parts of the body that are used for breathing.  Examples of this illness include a cold, the flu, and a respiratory syncytial virus (RSV) infection.  The infection can cause a runny nose, cough, sore throat, and fever.  Follow what your doctor tells you about taking medicines, drinking lots of fluid, washing your hands, resting at home, and avoiding people who are sick.

## 2023-12-01 NOTE — ED PROVIDER NOTE - OBJECTIVE STATEMENT
67y F with history of hypertension, asthma, systolic heart failure, pneumonia earlier this year presents for cough on and off x1 month.  Patient states her hacking cough, congestion, and sneezing have not been improving.  She has been taking more of her asthma medications compared to baseline.  She has been feeling short of breath at times, had minor sinus pain and had headaches, but denies fever, chills, sweats, dizziness, lightheadedness, chest pain, abdominal pain, diarrhea, constipation, or urinary symptoms.  Patient states she had been feeling better at one point but the next day symptoms came back.  Patient has been humidifier with no improvement but Tylenol severe with improvement of symptoms.  Patient lives with a 7-year-old grandchild that has given antibiotics for possible pneumonia recently.  Patient denies smoking for the past 35 years.    Pt takes Symbicort in the morning and night. She took her morning dose. She has been needing to take nebulization everyday to feel better, with the last one being yesterday. Pt takes Lasix every 2 days, and the last time she took it was on Wednesday.

## 2023-12-28 ENCOUNTER — RX RENEWAL (OUTPATIENT)
Age: 68
End: 2023-12-28

## 2024-01-05 ENCOUNTER — RX RENEWAL (OUTPATIENT)
Age: 69
End: 2024-01-05

## 2024-03-15 ENCOUNTER — APPOINTMENT (OUTPATIENT)
Dept: CARDIOLOGY | Facility: CLINIC | Age: 69
End: 2024-03-15

## 2024-10-15 NOTE — ED PROVIDER NOTE - CCCP TRG CHIEF CMPLNT
asthma exacerbation Patient requests all Lab, Cardiology, and Radiology Results on their Discharge Instructions

## 2024-11-08 NOTE — ED ADULT NURSE NOTE - DRUG PRE-SCREENING (DAST -1)
Group Topic:  Substance Abuse Process Group    Date: 11/7/2024  Start Time: 1815  End Time: 1900  Facilitators: Mayur Parsons    Focus: Addiction and Recovery Group with Marlene  Number in attendance: 7    Method: Group    Patient Evaluation: Invited - refused to attend     Statement Selected
